# Patient Record
Sex: FEMALE | NOT HISPANIC OR LATINO | Employment: FULL TIME | URBAN - METROPOLITAN AREA
[De-identification: names, ages, dates, MRNs, and addresses within clinical notes are randomized per-mention and may not be internally consistent; named-entity substitution may affect disease eponyms.]

---

## 2023-07-25 ENCOUNTER — OFFICE VISIT (OUTPATIENT)
Dept: OBGYN CLINIC | Facility: CLINIC | Age: 28
End: 2023-07-25
Payer: COMMERCIAL

## 2023-07-25 VITALS
HEIGHT: 67 IN | BODY MASS INDEX: 23.7 KG/M2 | SYSTOLIC BLOOD PRESSURE: 118 MMHG | WEIGHT: 151 LBS | DIASTOLIC BLOOD PRESSURE: 66 MMHG

## 2023-07-25 DIAGNOSIS — N91.2 AMENORRHEA: Primary | ICD-10-CM

## 2023-07-25 PROCEDURE — 99203 OFFICE O/P NEW LOW 30 MIN: CPT | Performed by: OBSTETRICS & GYNECOLOGY

## 2023-07-25 PROCEDURE — 76817 TRANSVAGINAL US OBSTETRIC: CPT | Performed by: OBSTETRICS & GYNECOLOGY

## 2023-07-25 RX ORDER — ACETAMINOPHEN 325 MG/1
650 TABLET ORAL EVERY 6 HOURS PRN
COMMUNITY

## 2023-07-25 NOTE — PROGRESS NOTES
Assessment/Plan:  - Viable IUP @ 10w 2d EGA  - MONICA 2024  -referral to MFM   - Continue PNV  - Patient to call for concerns  - RTO 3 weeks for OB intake    Diagnoses and all orders for this visit:    Amenorrhea  -     AMB US OB < 14 weeks single or first gestation level 1    Other orders  -     Prenatal Vit-DSS-Fe Cbn-FA (PRENATAL AD PO); Take by mouth  -     acetaminophen (TYLENOL) 325 mg tablet; Take 650 mg by mouth every 6 (six) hours as needed for mild pain                 Subjective:       Patient ID: Tobi Gray [id number]        Tobi Gray is a 32 y.o. Davis Dancer presenting to the office for pregnancy confirmation. Patient's last menstrual period was 2023 (exact date). , placing her at 10w2d today with MONICA of 2024. She is feeling nauseous. OB History    Para Term  AB Living   1 1 0 1   1   SAB IAB Ectopic Multiple Live Births           1      # Outcome Date GA Lbr Freddy/2nd Weight Sex Delivery Anes PTL Lv   1  22    M Vag-Spont   SINGH         The following portions of the patient's history were reviewed and updated as appropriate: allergies, past family history, past social history and problem list.    Allergies:  Cat hair extract    Medications:    Current Outpatient Medications:   •  acetaminophen (TYLENOL) 325 mg tablet, Take 650 mg by mouth every 6 (six) hours as needed for mild pain, Disp: , Rfl:   •  Prenatal Vit-DSS-Fe Cbn-FA (PRENATAL AD PO), Take by mouth, Disp: , Rfl:       Review of Systems:   Review of Systems       Objective:       Visit Vitals  /66 (BP Location: Right arm, Patient Position: Sitting, Cuff Size: Standard)   Ht 5' 7" (1.702 m)   Wt 68.5 kg (151 lb)   LMP 2023 (Exact Date)   BMI 23.65 kg/m²   OB Status Unknown   Smoking Status Never   BSA 1.79 m²        GEN: The patient was alert and oriented x3, pleasant well-appearing female in no acute distress.    CV: Regular rate  PULM: nonlabored respirations  MSK: Normal gait  Skin: warm, dry  Neuro: no focal deficits  Psych: normal affect and judgement, cooperative    Ultrasound:     Viability US     Gestational sac: present               Location: intrauterine  Yolk sac: present  Fetal pole: present               CRL: 3.86 cm = 10w4d  Cardiac activity: present               Rate: 167 bpm     Ovaries: normal appearing bilaterally  Cul de sac: absence of free fluid  Uterus: normal in appearance           Cee Martinez MD  07/25/23  1:02 PM

## 2023-07-25 NOTE — PROGRESS NOTES
OB INTAKE INTERVIEW  Pt presents for OB intake. The patient was oriented to our practice and all questions were answered. Plan:  - Prenatal labs ordered  - Referral given for MFM at intake for anatomy scan  - Carrier screens reviewed- declined  - Patient to call for concerns  - RTO 3-4 weeks for OB F/U visit and PAP/Cultures    ~Last pap: 2-3 years ago; repeat at first prenatal   ~Last gc/c: none    Last Menstrual Period:  2023                  Ultrasound date: 23 @ 10w 2d  MONICA 2024    OB History    Para Term  AB Living   2 1 1 0   1   SAB IAB Ectopic Multiple Live Births           1      # Outcome Date GA Lbr Freddy/2nd Weight Sex Delivery Anes PTL Lv   2 Current            1 Term 22 39w0d  2977 g (6 lb 9 oz) M Vag-Spont EPI  SINGH       Current Issues:  Constipation : yes taking Colace                Headaches : no                 Cramping: no                     Spotting :no                      Current Medications:  prenatal vitamin, Colace and Tylenol as needed        Interview education  · [facility] Pregnancy Essentials reviewed and discussed   · Baby and [address] Handout  · [facility] MFM Handouts  · Discussed genetic testing  · Prenatal lab work: Scripts printed and given to pt.    · FMLA paperwork to be started around 28 weeks      Prior Pregnancy Delivery Complications              History of  delivery or PPROM: no  History of Shoulder Dystocia: no              History of vacuum or forceps delivery: no              History of 3rd/4th degree laceration: no              History of  section: no     Diabetes              Pregestational DM: no              hx of GDM*: no              BMI >35*: no              first degree relative with type 2 diabetes*: no              hx of PCOS: no              current metformin use: no              prior hx of LGA/macrosomia*: no  *If yes add early 1HR glucose*               Hypertension              Hx of chronic HTN*: no              hx of gestational HTN*: no              hx of preeclampsia, eclampsia, or HELLP syndrome*: YES at delivery- normal Bps during the pregnancy. Pt was on Procardia for six weeks after delivery              Age 28 or older: no              Multifetal gestation:no  Type 1 or Type 2 DM:no  Renal Disease: no  Autoimmune disease (systemic lupus erythematosus, antiphospholipid antibody syndrome): no  Nulliparity: no  Obesity (BMI over 30): no  More than 10 year pregnancy interval: no  Previous IUGR, low birthweight or small for gestational age:no    CMP, PrCr, Uric acid added to labs     Immunizations:                influenza vaccine: yes              Covid Vaccination: yes  Discussed Tdap vaccine: yes                     Dental visit with last 6 months:  yes  PHQ-2/9 score: 0   - Hx of depression or post partum depression?: 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 She will let us know how she feels at each visit.     Grey activated (not 1518 years of age)?: yes    ONAF submitted?: no

## 2023-07-26 ENCOUNTER — INITIAL PRENATAL (OUTPATIENT)
Dept: OBGYN CLINIC | Facility: CLINIC | Age: 28
End: 2023-07-26

## 2023-07-26 VITALS — DIASTOLIC BLOOD PRESSURE: 74 MMHG | BODY MASS INDEX: 23.96 KG/M2 | SYSTOLIC BLOOD PRESSURE: 134 MMHG | WEIGHT: 153 LBS

## 2023-07-26 DIAGNOSIS — Z34.81 ENCOUNTER FOR SUPERVISION OF OTHER NORMAL PREGNANCY IN FIRST TRIMESTER: ICD-10-CM

## 2023-07-26 DIAGNOSIS — O09.299 HISTORY OF PRE-ECLAMPSIA IN PRIOR PREGNANCY, CURRENTLY PREGNANT: Primary | ICD-10-CM

## 2023-07-26 NOTE — PATIENT INSTRUCTIONS
Please refer to [place] Pregnancy Essentials Guide  [facility] ([email or web address])  to access our pregnancy essentials reference guide. Here you will find a lot of information for all trimesters of pregnancy as well as postpartum. You will be able to access information about medications that are safe to use during pregnancy, warning signs in third trimester, what to pack for your hospital stay and many other useful guides. There is also information on class available through our [address] and Pregnancy Classes  [facility] ([email or web address]). Please do not hesitate to contact the office through [address] [place] [address] or by calling for [phone] with any questions or concerns. We look forward to seeing you at your next scheduled visit!

## 2023-08-07 ENCOUNTER — TELEPHONE (OUTPATIENT)
Facility: HOSPITAL | Age: 28
End: 2023-08-07

## 2023-08-07 ENCOUNTER — APPOINTMENT (OUTPATIENT)
Dept: LAB | Facility: AMBULARY SURGERY CENTER | Age: 28
End: 2023-08-07
Payer: COMMERCIAL

## 2023-08-07 DIAGNOSIS — O09.299 HISTORY OF PRE-ECLAMPSIA IN PRIOR PREGNANCY, CURRENTLY PREGNANT: ICD-10-CM

## 2023-08-07 DIAGNOSIS — Z34.81 ENCOUNTER FOR SUPERVISION OF OTHER NORMAL PREGNANCY IN FIRST TRIMESTER: ICD-10-CM

## 2023-08-07 LAB
ABO GROUP BLD: NORMAL
ALBUMIN SERPL BCP-MCNC: 3.1 G/DL (ref 3.5–5)
ALP SERPL-CCNC: 52 U/L (ref 46–116)
ALT SERPL W P-5'-P-CCNC: 22 U/L (ref 12–78)
ANION GAP SERPL CALCULATED.3IONS-SCNC: 2 MMOL/L
AST SERPL W P-5'-P-CCNC: 16 U/L (ref 5–45)
BASOPHILS # BLD AUTO: 0.04 THOUSANDS/ÂΜL (ref 0–0.1)
BASOPHILS NFR BLD AUTO: 0 % (ref 0–1)
BILIRUB SERPL-MCNC: 0.49 MG/DL (ref 0.2–1)
BLD GP AB SCN SERPL QL: NEGATIVE
BUN SERPL-MCNC: 10 MG/DL (ref 5–25)
CALCIUM ALBUM COR SERPL-MCNC: 9.1 MG/DL (ref 8.3–10.1)
CALCIUM SERPL-MCNC: 8.4 MG/DL (ref 8.3–10.1)
CHLORIDE SERPL-SCNC: 109 MMOL/L (ref 96–108)
CO2 SERPL-SCNC: 27 MMOL/L (ref 21–32)
CREAT SERPL-MCNC: 0.61 MG/DL (ref 0.6–1.3)
CREAT UR-MCNC: 79.2 MG/DL
EOSINOPHIL # BLD AUTO: 0.09 THOUSAND/ÂΜL (ref 0–0.61)
EOSINOPHIL NFR BLD AUTO: 1 % (ref 0–6)
ERYTHROCYTE [DISTWIDTH] IN BLOOD BY AUTOMATED COUNT: 13.4 % (ref 11.6–15.1)
GFR SERPL CREATININE-BSD FRML MDRD: 122 ML/MIN/1.73SQ M
GLUCOSE P FAST SERPL-MCNC: 82 MG/DL (ref 65–99)
HBV SURFACE AG SER QL: NORMAL
HCT VFR BLD AUTO: 36.9 % (ref 34.8–46.1)
HCV AB SER QL: NORMAL
HGB BLD-MCNC: 12.5 G/DL (ref 11.5–15.4)
HIV 1+2 AB+HIV1 P24 AG SERPL QL IA: NORMAL
HIV 2 AB SERPL QL IA: NORMAL
HIV1 AB SERPL QL IA: NORMAL
HIV1 P24 AG SERPL QL IA: NORMAL
IMM GRANULOCYTES # BLD AUTO: 0.05 THOUSAND/UL (ref 0–0.2)
IMM GRANULOCYTES NFR BLD AUTO: 1 % (ref 0–2)
LYMPHOCYTES # BLD AUTO: 1.59 THOUSANDS/ÂΜL (ref 0.6–4.47)
LYMPHOCYTES NFR BLD AUTO: 18 % (ref 14–44)
MCH RBC QN AUTO: 30.1 PG (ref 26.8–34.3)
MCHC RBC AUTO-ENTMCNC: 33.9 G/DL (ref 31.4–37.4)
MCV RBC AUTO: 89 FL (ref 82–98)
MONOCYTES # BLD AUTO: 0.41 THOUSAND/ÂΜL (ref 0.17–1.22)
MONOCYTES NFR BLD AUTO: 5 % (ref 4–12)
NEUTROPHILS # BLD AUTO: 6.79 THOUSANDS/ÂΜL (ref 1.85–7.62)
NEUTS SEG NFR BLD AUTO: 75 % (ref 43–75)
NRBC BLD AUTO-RTO: 0 /100 WBCS
PLATELET # BLD AUTO: 278 THOUSANDS/UL (ref 149–390)
PMV BLD AUTO: 10.4 FL (ref 8.9–12.7)
POTASSIUM SERPL-SCNC: 3.8 MMOL/L (ref 3.5–5.3)
PROT SERPL-MCNC: 6.6 G/DL (ref 6.4–8.4)
PROT UR-MCNC: 41 MG/DL
PROT/CREAT UR: 0.52 MG/G{CREAT} (ref 0–0.1)
RBC # BLD AUTO: 4.15 MILLION/UL (ref 3.81–5.12)
RH BLD: POSITIVE
RUBV IGG SERPL IA-ACNC: 16 IU/ML
SODIUM SERPL-SCNC: 138 MMOL/L (ref 135–147)
SPECIMEN EXPIRATION DATE: NORMAL
TREPONEMA PALLIDUM IGG+IGM AB [PRESENCE] IN SERUM OR PLASMA BY IMMUNOASSAY: NORMAL
URATE SERPL-MCNC: 3.9 MG/DL (ref 2–7.5)
VZV IGG SER QL IA: NORMAL
WBC # BLD AUTO: 8.97 THOUSAND/UL (ref 4.31–10.16)

## 2023-08-07 PROCEDURE — 85025 COMPLETE CBC W/AUTO DIFF WBC: CPT

## 2023-08-07 PROCEDURE — 87340 HEPATITIS B SURFACE AG IA: CPT

## 2023-08-07 PROCEDURE — 82570 ASSAY OF URINE CREATININE: CPT

## 2023-08-07 PROCEDURE — 86900 BLOOD TYPING SEROLOGIC ABO: CPT

## 2023-08-07 PROCEDURE — 80053 COMPREHEN METABOLIC PANEL: CPT

## 2023-08-07 PROCEDURE — 86762 RUBELLA ANTIBODY: CPT

## 2023-08-07 PROCEDURE — 86787 VARICELLA-ZOSTER ANTIBODY: CPT

## 2023-08-07 PROCEDURE — 87086 URINE CULTURE/COLONY COUNT: CPT

## 2023-08-07 PROCEDURE — 84550 ASSAY OF BLOOD/URIC ACID: CPT

## 2023-08-07 PROCEDURE — 87389 HIV-1 AG W/HIV-1&-2 AB AG IA: CPT

## 2023-08-07 PROCEDURE — 86780 TREPONEMA PALLIDUM: CPT

## 2023-08-07 PROCEDURE — 36415 COLL VENOUS BLD VENIPUNCTURE: CPT

## 2023-08-07 PROCEDURE — 86850 RBC ANTIBODY SCREEN: CPT

## 2023-08-07 PROCEDURE — 86901 BLOOD TYPING SEROLOGIC RH(D): CPT

## 2023-08-07 PROCEDURE — 84156 ASSAY OF PROTEIN URINE: CPT

## 2023-08-07 PROCEDURE — 86803 HEPATITIS C AB TEST: CPT

## 2023-08-07 NOTE — TELEPHONE ENCOUNTER
Patient returned phone call in regards to 8/10 appointment that was scheduled. Patient does not want to reschedule. Offered patient genetic counseling but patient declined.

## 2023-08-08 LAB — BACTERIA UR CULT: NORMAL

## 2023-08-09 ENCOUNTER — ROUTINE PRENATAL (OUTPATIENT)
Dept: OBGYN CLINIC | Facility: CLINIC | Age: 28
End: 2023-08-09

## 2023-08-09 VITALS — BODY MASS INDEX: 23.93 KG/M2 | DIASTOLIC BLOOD PRESSURE: 70 MMHG | SYSTOLIC BLOOD PRESSURE: 132 MMHG | WEIGHT: 152.8 LBS

## 2023-08-09 DIAGNOSIS — Z87.59 HISTORY OF PRE-ECLAMPSIA: Primary | ICD-10-CM

## 2023-08-09 DIAGNOSIS — Z3A.12 12 WEEKS GESTATION OF PREGNANCY: ICD-10-CM

## 2023-08-09 DIAGNOSIS — N63.0 BREAST LUMP IN UPPER OUTER QUADRANT: ICD-10-CM

## 2023-08-09 DIAGNOSIS — Z34.81 ENCOUNTER FOR SUPERVISION OF OTHER NORMAL PREGNANCY IN FIRST TRIMESTER: ICD-10-CM

## 2023-08-09 PROCEDURE — G0145 SCR C/V CYTO,THINLAYER,RESCR: HCPCS | Performed by: OBSTETRICS & GYNECOLOGY

## 2023-08-09 PROCEDURE — 87491 CHLMYD TRACH DNA AMP PROBE: CPT | Performed by: OBSTETRICS & GYNECOLOGY

## 2023-08-09 PROCEDURE — PNV: Performed by: OBSTETRICS & GYNECOLOGY

## 2023-08-09 PROCEDURE — 87591 N.GONORRHOEAE DNA AMP PROB: CPT | Performed by: OBSTETRICS & GYNECOLOGY

## 2023-08-09 RX ORDER — ASPIRIN 81 MG/1
162 TABLET, CHEWABLE ORAL DAILY
Qty: 60 TABLET | Refills: 5 | Status: SHIPPED | OUTPATIENT
Start: 2023-08-09 | End: 2023-09-08

## 2023-08-09 NOTE — PROGRESS NOTES
32 y.o.  female at [address] (Estimated Date of Delivery: 24) for PNV. Pre- Vitals    Flowsheet Row Most Recent Value   Prenatal Assessment    Prenatal Vitals    Blood Pressure 132/70   Weight - Scale 69.3 kg (152 lb 12.8 oz)   Urine Albumin/Glucose    Dilation/Effacement/Station    Vaginal Drainage    Edema         TWG: Not found. Pap collected  Hx of Pre-E, baseline Protein: Cr ratio is elevated at 0.52 (discussed)  Sent Rx for Aspirin 162mg po daily and advised to c/w until 36 weeks, reviewed indication to reduce risk for pre-E  Pt. Declines NT and NIPT; she canceled MFM appt. Concerned with right breast lump she has had since breastfeeding her last child. On exam right breast lump palpated ~2 cm from areola at 11-12 o'clock. Order for breast u/s.

## 2023-08-09 NOTE — PROGRESS NOTES
Patient states she is feeling well, no complaints. She states after she stopped breastfeeding her son she noticed a hard spot in her right breast. Patient would also like to review recent lab work.

## 2023-08-11 LAB
C TRACH DNA SPEC QL NAA+PROBE: NEGATIVE
N GONORRHOEA DNA SPEC QL NAA+PROBE: NEGATIVE

## 2023-08-15 LAB
LAB AP GYN PRIMARY INTERPRETATION: NORMAL
Lab: NORMAL
PATH INTERP SPEC-IMP: NORMAL

## 2023-09-07 ENCOUNTER — ROUTINE PRENATAL (OUTPATIENT)
Dept: OBGYN CLINIC | Facility: CLINIC | Age: 28
End: 2023-09-07

## 2023-09-07 VITALS — DIASTOLIC BLOOD PRESSURE: 68 MMHG | SYSTOLIC BLOOD PRESSURE: 122 MMHG | BODY MASS INDEX: 24.59 KG/M2 | WEIGHT: 157 LBS

## 2023-09-07 DIAGNOSIS — Z34.82 PRENATAL CARE, SUBSEQUENT PREGNANCY IN SECOND TRIMESTER: Primary | ICD-10-CM

## 2023-09-07 DIAGNOSIS — Z36.9 ANTENATAL SCREENING ENCOUNTER: ICD-10-CM

## 2023-09-07 PROCEDURE — PNV: Performed by: PHYSICIAN ASSISTANT

## 2023-09-07 NOTE — PROGRESS NOTES
Patient states that she has been experiencing more of a light discharge since pregnancy that she is just wondering if that's normal. She denies any pain or irritation but states she did have a mild itch last weekend that has since went away.

## 2023-09-07 NOTE — PROGRESS NOTES
25 y.o.  female at 16w4d (Estimated Date of Delivery: 24) for PNV. Pre-Latosha Vitals    Flowsheet Row Most Recent Value   Prenatal Assessment    Movement Absent   Prenatal Vitals    Blood Pressure 122/68   Weight - Scale 71.2 kg (157 lb)   Urine Albumin/Glucose    Dilation/Effacement/Station    Vaginal Drainage    Edema         TW.814 kg (4 lb)    Leakage of fluid: no  Vaginal bleeding: no  Contractions/Cramping: no  Fetal movement: no    Nausea improving  rx for afp given  Adv ok to keep eye on discharge--scant, thin white  No itch or burn or odor  Witch hazel prn    RTO in 4 weeks.

## 2023-09-22 ENCOUNTER — HOSPITAL ENCOUNTER (OUTPATIENT)
Dept: ULTRASOUND IMAGING | Facility: CLINIC | Age: 28
End: 2023-09-22
Payer: COMMERCIAL

## 2023-09-22 DIAGNOSIS — N63.0 BREAST LUMP IN UPPER OUTER QUADRANT: ICD-10-CM

## 2023-09-22 PROCEDURE — 76642 ULTRASOUND BREAST LIMITED: CPT

## 2023-10-05 ENCOUNTER — ROUTINE PRENATAL (OUTPATIENT)
Facility: HOSPITAL | Age: 28
End: 2023-10-05
Attending: OBSTETRICS & GYNECOLOGY
Payer: COMMERCIAL

## 2023-10-05 ENCOUNTER — ROUTINE PRENATAL (OUTPATIENT)
Dept: OBGYN CLINIC | Facility: CLINIC | Age: 28
End: 2023-10-05

## 2023-10-05 VITALS
SYSTOLIC BLOOD PRESSURE: 118 MMHG | WEIGHT: 164.46 LBS | DIASTOLIC BLOOD PRESSURE: 50 MMHG | BODY MASS INDEX: 25.81 KG/M2 | HEART RATE: 69 BPM | HEIGHT: 67 IN

## 2023-10-05 VITALS — WEIGHT: 162.6 LBS | DIASTOLIC BLOOD PRESSURE: 62 MMHG | SYSTOLIC BLOOD PRESSURE: 118 MMHG | BODY MASS INDEX: 25.47 KG/M2

## 2023-10-05 DIAGNOSIS — Z36.3 ENCOUNTER FOR ANTENATAL SCREENING FOR MALFORMATION: ICD-10-CM

## 2023-10-05 DIAGNOSIS — O12.12 PROTEINURIA AFFECTING PREGNANCY IN SECOND TRIMESTER: Primary | ICD-10-CM

## 2023-10-05 DIAGNOSIS — Z3A.20 20 WEEKS GESTATION OF PREGNANCY: ICD-10-CM

## 2023-10-05 DIAGNOSIS — O09.299 HISTORY OF PRE-ECLAMPSIA IN PRIOR PREGNANCY, CURRENTLY PREGNANT: ICD-10-CM

## 2023-10-05 DIAGNOSIS — Z34.81 ENCOUNTER FOR SUPERVISION OF OTHER NORMAL PREGNANCY IN FIRST TRIMESTER: ICD-10-CM

## 2023-10-05 DIAGNOSIS — Z36.86 ENCOUNTER FOR ANTENATAL SCREENING FOR CERVICAL LENGTH: ICD-10-CM

## 2023-10-05 PROCEDURE — 76811 OB US DETAILED SNGL FETUS: CPT | Performed by: OBSTETRICS & GYNECOLOGY

## 2023-10-05 PROCEDURE — 99243 OFF/OP CNSLTJ NEW/EST LOW 30: CPT | Performed by: OBSTETRICS & GYNECOLOGY

## 2023-10-05 PROCEDURE — 76817 TRANSVAGINAL US OBSTETRIC: CPT | Performed by: OBSTETRICS & GYNECOLOGY

## 2023-10-05 PROCEDURE — PNV: Performed by: OBSTETRICS & GYNECOLOGY

## 2023-10-05 NOTE — PROGRESS NOTES
25 y.o.   female at 22.1 wga for PNV. BP : 118/62. TW  Feeling well.   No complaints  Proteinuria - will repeat P:C, if elevated will do 24h urine collection  Reviewed PTL precautions  Flu vaccine next visit  F/u 4 weeks

## 2023-10-05 NOTE — PROGRESS NOTES
Zachary Aldridge Md  [address] [facility],  [address] [facility]     Dear Dr. Vaishali Lang     Thank you for requesting a  consultation on your patient Trevon Horton for the following indications: Fetal anatomy survey. She previously declined nuchal translucency and NIPT through her OB office. She has not completed her MSAFP screen by today's ultrasound. History  Past medical history: History of preeclampsia found at the time of delivery. She reports she was discharged home with recommendations to monitor her blood pressures. She was readmitted 3 days later and was started on blood pressure medication that she continued for the next 6 weeks. Past surgical history: [place] tooth extraction  Medications: Aspirin 162 mg daily, Colace, MiraLAX, prenatal vitamins  Allergies to medications: None  Past obstetrical history:  3/18/2022 at 39 weeks and 0 days she had 6 pound 9 ounce baby boy vaginally with a history of preeclampsia diagnosed at the time of delivery. Social history: Does not report any substance use  First generation family history: Her partner has type 1 diabetes    Review of her prenatal labs shows a PC ratio of 0.52 which is elevated for pregnancy. Rest of her baseline preeclamptic labs were normal.  A urine culture showed no evidence for infection. I did not see any evidence for a urine analysis drawn at the same time. Ultrasound findings: Today's ultrasound shows a fetus that is growing concordant with her dates. Cervical length and amniotic fluid appears normal.  No malformations are detected. The patient was informed of the findings and counseled about the limitations of the exam in detecting all forms of fetal congenital abnormalities. She does not report any vaginal bleeding or uterine cramping/contractions. She does feel fetal movement. Specific counseling was provided on the following problems:  1.   She was previously started on baby aspirin 162 mg daily to lower her risk for developing preeclampsia. Baseline preeclamptic labs showed a elevated PC ratio of 0.52. This can be associated with increased risk for preeclampsia if this value is confirmed. Follow up recommended:     No further follow-up ultrasounds are recommended at this time. She is seeing her provider next. Recommend a repeat PC ratio to confirm if this truly is elevated or is a false positive result. If it returns again is elevated then recommend a 24-hour protein level to get a more accurate 24 hr measurement. Pre visit time reviewing her records  5 minutes  Face to face time 10 minutes  Post visit time on documentation of note, updating her problem list, adding orders and prescriptions 15 minutes. Procedures that were completed today were charged separately. The level of decision making was low level complexity.     Belen Armstrong MD

## 2023-10-05 NOTE — PROGRESS NOTES
Patient states she had MFM appointment today and has questions regarding lab work that was done in August.     Urine neg/neg

## 2023-10-05 NOTE — LETTER
2023     Juliette Lucia MD  [address] [facility]  [address]  [address] [facility]    Patient: Rodger Fitzgerald   YOB: 1995   Date of Visit: 10/5/2023       Dear Dr. Tiana Barron:    Thank you for referring Rodger Fitzgerald to me for evaluation. Below are my notes for this consultation. If you have questions, please do not hesitate to call me. I look forward to following your patient along with you. Sincerely,        Andrew Sargent MD        CC: No Recipients    Andrew Sargent MD  10/5/2023  2:35 PM  Sign when Signing Visit  Tavares Barrios Md  [address] [facility],  [address] [facility]     Dear Dr. Tiana Barron     Thank you for requesting a  consultation on your patient Rodger Fitzgerald for the following indications: Fetal anatomy survey. She previously declined nuchal translucency and NIPT through her OB office. She has not completed her MSAFP screen by today's ultrasound. History  Past medical history: History of preeclampsia found at the time of delivery. She reports she was discharged home with recommendations to monitor her blood pressures. She was readmitted 3 days later and was started on blood pressure medication that she continued for the next 6 weeks. Past surgical history: [place] tooth extraction  Medications: Aspirin 162 mg daily, Colace, MiraLAX, prenatal vitamins  Allergies to medications: None  Past obstetrical history:  3/18/2022 at 39 weeks and 0 days she had 6 pound 9 ounce baby boy vaginally with a history of preeclampsia diagnosed at the time of delivery. Social history: Does not report any substance use  First generation family history: Her partner has type 1 diabetes    Review of her prenatal labs shows a PC ratio of 0.52 which is elevated for pregnancy. Rest of her baseline preeclamptic labs were normal.  A urine culture showed no evidence for infection.   I did not see any evidence for a urine analysis drawn at the same Pt transferred to G35, report received from Onur SHERMAN.    time.     Ultrasound findings: Today's ultrasound shows a fetus that is growing concordant with her dates. Cervical length and amniotic fluid appears normal.  No malformations are detected. The patient was informed of the findings and counseled about the limitations of the exam in detecting all forms of fetal congenital abnormalities. She does not report any vaginal bleeding or uterine cramping/contractions. She does feel fetal movement. Specific counseling was provided on the following problems:  1. She was previously started on baby aspirin 162 mg daily to lower her risk for developing preeclampsia. Baseline preeclamptic labs showed a elevated PC ratio of 0.52. This can be associated with increased risk for preeclampsia if this value is confirmed. Follow up recommended:     No further follow-up ultrasounds are recommended at this time. She is seeing her provider next. Recommend a repeat PC ratio to confirm if this truly is elevated or is a false positive result. If it returns again is elevated then recommend a 24-hour protein level to get a more accurate 24 hr measurement. Pre visit time reviewing her records  5 minutes  Face to face time 10 minutes  Post visit time on documentation of note, updating her problem list, adding orders and prescriptions 15 minutes. Procedures that were completed today were charged separately. The level of decision making was low level complexity.     Elisa Barbosa MD

## 2023-10-07 ENCOUNTER — APPOINTMENT (OUTPATIENT)
Dept: LAB | Facility: CLINIC | Age: 28
End: 2023-10-07
Payer: COMMERCIAL

## 2023-10-07 DIAGNOSIS — Z34.82 PRENATAL CARE, SUBSEQUENT PREGNANCY IN SECOND TRIMESTER: ICD-10-CM

## 2023-10-07 DIAGNOSIS — O12.12 PROTEINURIA AFFECTING PREGNANCY IN SECOND TRIMESTER: ICD-10-CM

## 2023-10-07 DIAGNOSIS — O09.299 HISTORY OF PRE-ECLAMPSIA IN PRIOR PREGNANCY, CURRENTLY PREGNANT: ICD-10-CM

## 2023-10-07 DIAGNOSIS — Z36.9 ANTENATAL SCREENING ENCOUNTER: ICD-10-CM

## 2023-10-07 LAB
CREAT UR-MCNC: 22 MG/DL
PROT UR-MCNC: 7 MG/DL
PROT/CREAT UR: 0.32 MG/G{CREAT} (ref 0–0.1)

## 2023-10-07 PROCEDURE — 82105 ALPHA-FETOPROTEIN SERUM: CPT

## 2023-10-07 PROCEDURE — 82570 ASSAY OF URINE CREATININE: CPT

## 2023-10-07 PROCEDURE — 84156 ASSAY OF PROTEIN URINE: CPT

## 2023-10-07 PROCEDURE — 36415 COLL VENOUS BLD VENIPUNCTURE: CPT

## 2023-10-09 DIAGNOSIS — O12.12 PROTEINURIA AFFECTING PREGNANCY IN SECOND TRIMESTER: Primary | ICD-10-CM

## 2023-10-10 ENCOUNTER — APPOINTMENT (OUTPATIENT)
Dept: LAB | Facility: AMBULARY SURGERY CENTER | Age: 28
End: 2023-10-10

## 2023-10-11 ENCOUNTER — APPOINTMENT (OUTPATIENT)
Dept: LAB | Facility: AMBULARY SURGERY CENTER | Age: 28
End: 2023-10-11

## 2023-10-11 ENCOUNTER — TELEPHONE (OUTPATIENT)
Dept: OBGYN CLINIC | Facility: CLINIC | Age: 28
End: 2023-10-11

## 2023-10-11 NOTE — TELEPHONE ENCOUNTER
Patient called, her AFP screened positive.  Is someone from [facility] able to call her to review the results with her

## 2023-10-12 ENCOUNTER — APPOINTMENT (OUTPATIENT)
Dept: LAB | Facility: AMBULARY SURGERY CENTER | Age: 28
End: 2023-10-12
Payer: COMMERCIAL

## 2023-10-12 ENCOUNTER — TELEPHONE (OUTPATIENT)
Dept: PERINATAL CARE | Facility: CLINIC | Age: 28
End: 2023-10-12

## 2023-10-12 LAB
2ND TRIMESTER 4 SCREEN SERPL-IMP: NORMAL
AFP ADJ MOM SERPL: 1.64
AFP INTERP AMN-IMP: NORMAL
AFP INTERP SERPL-IMP: NORMAL
AFP INTERP SERPL-IMP: NORMAL
AFP SERPL-MCNC: 102.7 NG/ML
AGE AT DELIVERY: 30.5 YR
GA METHOD: NORMAL
GA: 20.9 WEEKS
IDDM PATIENT QL: NO
MULTIPLE PREGNANCY: NO
NEURAL TUBE DEFECT RISK FETUS: 1895 %

## 2023-10-12 NOTE — TELEPHONE ENCOUNTER
Called patient and confirmed date of birth. Discussed previous elevated MSAFP and that the lab used wrong information when calculating the result. Patient stated she did see the result and was very upset, especially as she did not get a phone call from her OB office about it. She also saw the note that Dr. Corina Martinez sent her about the result. Explained that the MSAFP was recalculated using the correct information and the actual result was negative/within normal limits. Patient was very relieved to hear this news. Discussed that as it's normal we don't need to do anything further and will cancel her genetic counseling appointment tomorrow which she was agreeable to. All of the patient's questions were answered to her satisfaction and she was provided with my direct phone number for any further questions or concerns.

## 2023-10-16 ENCOUNTER — TELEPHONE (OUTPATIENT)
Dept: PERINATAL CARE | Facility: OTHER | Age: 28
End: 2023-10-16

## 2023-10-16 DIAGNOSIS — O12.12 PROTEINURIA AFFECTING PREGNANCY IN SECOND TRIMESTER: Primary | ICD-10-CM

## 2023-10-16 DIAGNOSIS — Z3A.22 22 WEEKS GESTATION OF PREGNANCY: ICD-10-CM

## 2023-10-17 ENCOUNTER — PATIENT MESSAGE (OUTPATIENT)
Dept: NEPHROLOGY | Facility: CLINIC | Age: 28
End: 2023-10-17

## 2023-10-17 NOTE — TELEPHONE ENCOUNTER
I called Rao Stevens this am at 8:07 about her baseline proteinuria and ordered a repeat urine culture since her last was done 2 months ago. She has a history of preeclampsia that developed postpartum. This can increase her risk of a recurrence. Etiology  of the proteinuria unknown. I recommended a repeat 24-hour urine at 6 weeks postpartum. If protein still elevated then would refer to nephrology. She is aware we do not usually refer to nephrology during pregnancy because part of the work-up may be a renal biopsy that is not usually completed in pregnancy due to the increased risk for bleeding complications and it often does not  in pregnancy.     Julio Albarran MD

## 2023-11-03 ENCOUNTER — ROUTINE PRENATAL (OUTPATIENT)
Dept: OBGYN CLINIC | Facility: CLINIC | Age: 28
End: 2023-11-03

## 2023-11-03 VITALS — WEIGHT: 170.8 LBS | SYSTOLIC BLOOD PRESSURE: 108 MMHG | DIASTOLIC BLOOD PRESSURE: 62 MMHG | BODY MASS INDEX: 26.75 KG/M2

## 2023-11-03 DIAGNOSIS — Z3A.24 24 WEEKS GESTATION OF PREGNANCY: ICD-10-CM

## 2023-11-03 DIAGNOSIS — O12.12 PROTEINURIA AFFECTING PREGNANCY IN SECOND TRIMESTER: Primary | ICD-10-CM

## 2023-11-03 PROCEDURE — PNV: Performed by: PHYSICIAN ASSISTANT

## 2023-11-03 NOTE — PROGRESS NOTES
Patient is a 26 YO  female presenting to the office at 24.5 weeks for routine OB care.    BP: 108/62  TWlb  Fetal Movement: yes good movement  LOF: no  VB: no  CTX: no  Reviewed precautions  28 week labs ordered  Call for concerns  RTO 4 weeks

## 2023-11-17 ENCOUNTER — ROUTINE PRENATAL (OUTPATIENT)
Dept: OBGYN CLINIC | Facility: CLINIC | Age: 28
End: 2023-11-17

## 2023-11-17 VITALS — DIASTOLIC BLOOD PRESSURE: 78 MMHG | SYSTOLIC BLOOD PRESSURE: 114 MMHG | BODY MASS INDEX: 27 KG/M2 | WEIGHT: 172.4 LBS

## 2023-11-17 DIAGNOSIS — O09.299 HISTORY OF PRE-ECLAMPSIA IN PRIOR PREGNANCY, CURRENTLY PREGNANT: Primary | ICD-10-CM

## 2023-11-17 DIAGNOSIS — Z23 NEED FOR INFLUENZA VACCINATION: ICD-10-CM

## 2023-11-17 DIAGNOSIS — Z3A.26 26 WEEKS GESTATION OF PREGNANCY: ICD-10-CM

## 2023-11-17 NOTE — PROGRESS NOTES
25 y.o.  female at 27w6d (Estimated Date of Delivery: 24) for PNV.     Pre-Latosha Vitals      Flowsheet Row Most Recent Value   Prenatal Assessment    Movement Present   Prenatal Vitals    Blood Pressure 114/78   Weight - Scale 78.2 kg (172 lb 6.4 oz)   Urine Albumin/Glucose    Dilation/Effacement/Station    Vaginal Drainage    Edema           TW.8 kg (19 lb 6.4 oz)  Doing well 2nd trimester  Encouraged to complete 26 wk labs  Flu shot today

## 2023-11-25 ENCOUNTER — LAB (OUTPATIENT)
Dept: LAB | Facility: CLINIC | Age: 28
End: 2023-11-25
Payer: COMMERCIAL

## 2023-11-25 DIAGNOSIS — O12.12 PROTEINURIA AFFECTING PREGNANCY IN SECOND TRIMESTER: ICD-10-CM

## 2023-11-25 DIAGNOSIS — Z3A.24 24 WEEKS GESTATION OF PREGNANCY: ICD-10-CM

## 2023-11-25 DIAGNOSIS — Z3A.22 22 WEEKS GESTATION OF PREGNANCY: ICD-10-CM

## 2023-11-25 LAB
ERYTHROCYTE [DISTWIDTH] IN BLOOD BY AUTOMATED COUNT: 13 % (ref 11.6–15.1)
GLUCOSE 1H P 50 G GLC PO SERPL-MCNC: 95 MG/DL (ref 40–134)
HCT VFR BLD AUTO: 35.5 % (ref 34.8–46.1)
HGB BLD-MCNC: 12.5 G/DL (ref 11.5–15.4)
MCH RBC QN AUTO: 32.9 PG (ref 26.8–34.3)
MCHC RBC AUTO-ENTMCNC: 35.2 G/DL (ref 31.4–37.4)
MCV RBC AUTO: 93 FL (ref 82–98)
PLATELET # BLD AUTO: 280 THOUSANDS/UL (ref 149–390)
PMV BLD AUTO: 10.1 FL (ref 8.9–12.7)
RBC # BLD AUTO: 3.8 MILLION/UL (ref 3.81–5.12)
WBC # BLD AUTO: 11.95 THOUSAND/UL (ref 4.31–10.16)

## 2023-11-25 PROCEDURE — 36415 COLL VENOUS BLD VENIPUNCTURE: CPT

## 2023-11-25 PROCEDURE — 86780 TREPONEMA PALLIDUM: CPT

## 2023-11-25 PROCEDURE — 85027 COMPLETE CBC AUTOMATED: CPT

## 2023-11-25 PROCEDURE — 87086 URINE CULTURE/COLONY COUNT: CPT

## 2023-11-25 PROCEDURE — 82950 GLUCOSE TEST: CPT

## 2023-11-26 LAB — BACTERIA UR CULT: ABNORMAL

## 2023-11-26 NOTE — RESULT ENCOUNTER NOTE
Gabriela Hsu,    This urine culture came back with normal vaginal khris. This is not a sign of an infection in your urine.     Eliane Ortiz MD

## 2023-11-27 LAB — TREPONEMA PALLIDUM IGG+IGM AB [PRESENCE] IN SERUM OR PLASMA BY IMMUNOASSAY: NORMAL

## 2023-12-01 ENCOUNTER — ROUTINE PRENATAL (OUTPATIENT)
Dept: OBGYN CLINIC | Facility: CLINIC | Age: 28
End: 2023-12-01
Payer: COMMERCIAL

## 2023-12-01 VITALS — BODY MASS INDEX: 27.38 KG/M2 | SYSTOLIC BLOOD PRESSURE: 112 MMHG | DIASTOLIC BLOOD PRESSURE: 60 MMHG | WEIGHT: 174.8 LBS

## 2023-12-01 DIAGNOSIS — Z3A.28 28 WEEKS GESTATION OF PREGNANCY: ICD-10-CM

## 2023-12-01 DIAGNOSIS — Z23 ENCOUNTER FOR IMMUNIZATION: ICD-10-CM

## 2023-12-01 DIAGNOSIS — O09.299 HISTORY OF PRE-ECLAMPSIA IN PRIOR PREGNANCY, CURRENTLY PREGNANT: Primary | ICD-10-CM

## 2023-12-01 LAB
DME PARACHUTE DELIVERY DATE REQUESTED: NORMAL
DME PARACHUTE ITEM DESCRIPTION: NORMAL
DME PARACHUTE ORDER STATUS: NORMAL
DME PARACHUTE SUPPLIER NAME: NORMAL
DME PARACHUTE SUPPLIER PHONE: NORMAL

## 2023-12-01 PROCEDURE — PNV: Performed by: OBSTETRICS & GYNECOLOGY

## 2023-12-01 PROCEDURE — 90715 TDAP VACCINE 7 YRS/> IM: CPT | Performed by: OBSTETRICS & GYNECOLOGY

## 2023-12-01 PROCEDURE — 90471 IMMUNIZATION ADMIN: CPT | Performed by: OBSTETRICS & GYNECOLOGY

## 2023-12-01 NOTE — PROGRESS NOTES
28 week, red folder visit. Pt is well, states there are no concerns. Denies vb,lof, and ctx.    Tdap: given   Breast pump: ordered

## 2023-12-01 NOTE — PROGRESS NOTES
This is a 25 y.o.  at 28w5d who presents for return OB visit. No complaints. Denies contractions, leakage, bleeding. Endorses fetal movement. 28 wk labs reviewed. TDAP given. Rhogam not indicated. 606/706 Catherine Gagnon reviewed. Consent signed. Full code. OK with blood transfusion     Doing well in 3rd trimester. Had some dark spotting. Denies any cramping. Encouraged to call with any red bleeding or cramping/ pain.

## 2023-12-14 PROBLEM — O12.13 PROTEINURIA AFFECTING PREGNANCY IN THIRD TRIMESTER: Status: ACTIVE | Noted: 2023-10-05

## 2023-12-14 PROBLEM — Z3A.30 30 WEEKS GESTATION OF PREGNANCY: Status: ACTIVE | Noted: 2023-10-05

## 2023-12-15 ENCOUNTER — ROUTINE PRENATAL (OUTPATIENT)
Dept: OBGYN CLINIC | Facility: CLINIC | Age: 28
End: 2023-12-15

## 2023-12-15 VITALS — BODY MASS INDEX: 27.72 KG/M2 | SYSTOLIC BLOOD PRESSURE: 114 MMHG | DIASTOLIC BLOOD PRESSURE: 66 MMHG | WEIGHT: 177 LBS

## 2023-12-15 DIAGNOSIS — O12.13 PROTEINURIA AFFECTING PREGNANCY IN THIRD TRIMESTER: Primary | ICD-10-CM

## 2023-12-15 DIAGNOSIS — Z3A.30 30 WEEKS GESTATION OF PREGNANCY: ICD-10-CM

## 2023-12-15 PROCEDURE — PNV: Performed by: OBSTETRICS & GYNECOLOGY

## 2023-12-15 NOTE — PROGRESS NOTES
Pt is here for her 30w prenatal. Pt denies any swelling,leakage,bleeding and contractions. Pt has shad some pressure/pinching but it went away. Pt also has some pain on her left glute some days.

## 2023-12-15 NOTE — PROGRESS NOTES
25 y.o.   female at 28.6 wga for PNV. BP : 114/66. TW  Feeling well.   No complaints  Reviewed RSV vaccine  Proteinuria - pt to see Nephrology PP  Reviewed PTL precautions and FKCs  F/u 2 weeks

## 2023-12-26 ENCOUNTER — ROUTINE PRENATAL (OUTPATIENT)
Dept: OBGYN CLINIC | Facility: CLINIC | Age: 28
End: 2023-12-26

## 2023-12-26 VITALS — WEIGHT: 180 LBS | DIASTOLIC BLOOD PRESSURE: 70 MMHG | BODY MASS INDEX: 28.19 KG/M2 | SYSTOLIC BLOOD PRESSURE: 120 MMHG

## 2023-12-26 DIAGNOSIS — O12.13 PROTEINURIA AFFECTING PREGNANCY IN THIRD TRIMESTER: Primary | ICD-10-CM

## 2023-12-26 DIAGNOSIS — Z3A.32 32 WEEKS GESTATION OF PREGNANCY: ICD-10-CM

## 2023-12-26 DIAGNOSIS — O09.293 HISTORY OF PRE-ECLAMPSIA IN PRIOR PREGNANCY, CURRENTLY PREGNANT, THIRD TRIMESTER: ICD-10-CM

## 2023-12-26 PROCEDURE — PNV: Performed by: PHYSICIAN ASSISTANT

## 2023-12-26 NOTE — PROGRESS NOTES
Patient is a 29 YO  female presenting to the office at 32.2 weeks for routine OB care.   BP: 120/70  TWlb  Fetal Movement: yes good movement   LOF: no  VB: no  CTX: no  Denies HA/blurry vision/visual changes  RSV brochure supplied to patient. Can receive at next visit if interested  Reviewed precautions  Call for concerns  RTO 2 weeks

## 2023-12-26 NOTE — PROGRESS NOTES
Routine prenatal 32 weeks. Pt is well, states there are no concerns at this time. Denies vb,lof, and ctx.

## 2024-01-10 ENCOUNTER — ROUTINE PRENATAL (OUTPATIENT)
Dept: OBGYN CLINIC | Facility: CLINIC | Age: 29
End: 2024-01-10

## 2024-01-10 VITALS — SYSTOLIC BLOOD PRESSURE: 110 MMHG | WEIGHT: 181 LBS | DIASTOLIC BLOOD PRESSURE: 66 MMHG | BODY MASS INDEX: 28.35 KG/M2

## 2024-01-10 DIAGNOSIS — Z3A.34 34 WEEKS GESTATION OF PREGNANCY: ICD-10-CM

## 2024-01-10 DIAGNOSIS — O12.13 PROTEINURIA AFFECTING PREGNANCY IN THIRD TRIMESTER: ICD-10-CM

## 2024-01-10 DIAGNOSIS — O09.293 HISTORY OF PRE-ECLAMPSIA IN PRIOR PREGNANCY, CURRENTLY PREGNANT, THIRD TRIMESTER: Primary | ICD-10-CM

## 2024-01-10 PROCEDURE — PNV

## 2024-01-10 NOTE — PROGRESS NOTES
Patient is a 29 YO  female presenting to the office at 34w3d for routine OB care.   Patient is feeling well today. Pt asymptomatic from recent COVID infection. Having more pelvic pressure, denies contractions.   She is discussing RSV vaccination with her partner, still unsure if she would like to receive this.   Had preeclampsia with prior pregnancy, she is concerned she will develop this again. Discussed warning signs with patient including persistent headache with visual changes, significant swelling especially of the face and neck, upper abdominal pain. Patient understands and will continue monitoring at home. Pt understands to call with any abnormal symptoms.   Fetal heart rate: 140  BP: 110/66  TW lb  Fetal Movement: yes  LOF: no  VB: no  CTX: no  Reviewed precautions  Call for concerns  RTO 2 weeks

## 2024-01-10 NOTE — PROGRESS NOTES
Mariela Mtz is 62atf3y, here for her routine ob appt; pt is having intermittent HA, pt states that she had covid 2 wks ago, cold like symptoms; pt also complaining of a slight rash on right hip and right upper arm. Pt is still thinking about the RSV vaccine, not ready to get today.    CTX/cramping:no, but feeling pressure in the pelvis.  LOF:  no  VB/spotting: no    +FM:  yes

## 2024-01-17 ENCOUNTER — ROUTINE PRENATAL (OUTPATIENT)
Dept: OBGYN CLINIC | Facility: CLINIC | Age: 29
End: 2024-01-17

## 2024-01-17 VITALS — SYSTOLIC BLOOD PRESSURE: 126 MMHG | WEIGHT: 183.4 LBS | BODY MASS INDEX: 28.72 KG/M2 | DIASTOLIC BLOOD PRESSURE: 78 MMHG

## 2024-01-17 DIAGNOSIS — Z36.9 ANTENATAL SCREENING ENCOUNTER: ICD-10-CM

## 2024-01-17 DIAGNOSIS — Z34.83 PRENATAL CARE, SUBSEQUENT PREGNANCY IN THIRD TRIMESTER: Primary | ICD-10-CM

## 2024-01-17 PROCEDURE — PNV: Performed by: PHYSICIAN ASSISTANT

## 2024-01-17 PROCEDURE — 87491 CHLMYD TRACH DNA AMP PROBE: CPT | Performed by: PHYSICIAN ASSISTANT

## 2024-01-17 PROCEDURE — 87150 DNA/RNA AMPLIFIED PROBE: CPT | Performed by: PHYSICIAN ASSISTANT

## 2024-01-17 PROCEDURE — 87591 N.GONORRHOEAE DNA AMP PROB: CPT | Performed by: PHYSICIAN ASSISTANT

## 2024-01-17 NOTE — PROGRESS NOTES
28 y.o.  female at 35w3d (Estimated Date of Delivery: 24) for PNV.    Pre-Latosha Vitals      Flowsheet Row Most Recent Value   Prenatal Assessment    Movement Present   Prenatal Vitals    Blood Pressure 126/78   Weight - Scale 83.2 kg (183 lb 6.4 oz)   Urine Albumin/Glucose    Dilation/Effacement/Station    Vaginal Drainage    Edema           TW.8 kg (30 lb 6.4 oz)    Leakage of fluid: no  Vaginal bleeding: no  Contractions/Cramping: no  Fetal movement: yes  Pt is feeling great overall  Occ HA  BPs have all been wnl  Pt knows to d/c ASA at 36 weeks  Gc/chlamyd and GBS cultures done today    RTO in 1 weeks.

## 2024-01-17 NOTE — PROGRESS NOTES
Mariela Mtz  is 1hrm3o, here for her routine ob appt:  pt is still having periodic headaches, pt does check BP at home, and has been 127/82.    CTX/cramping:  no  LOF:  no  VB/spotting:  no    +FM:  yes     UA neg/neg

## 2024-01-18 LAB
C TRACH DNA SPEC QL NAA+PROBE: NEGATIVE
N GONORRHOEA DNA SPEC QL NAA+PROBE: NEGATIVE

## 2024-01-19 LAB — GP B STREP DNA SPEC QL NAA+PROBE: NEGATIVE

## 2024-01-23 ENCOUNTER — ROUTINE PRENATAL (OUTPATIENT)
Dept: OBGYN CLINIC | Facility: CLINIC | Age: 29
End: 2024-01-23

## 2024-01-23 VITALS — DIASTOLIC BLOOD PRESSURE: 82 MMHG | SYSTOLIC BLOOD PRESSURE: 128 MMHG | BODY MASS INDEX: 28.66 KG/M2 | WEIGHT: 183 LBS

## 2024-01-23 DIAGNOSIS — Z3A.36 36 WEEKS GESTATION OF PREGNANCY: ICD-10-CM

## 2024-01-23 DIAGNOSIS — O12.13 PROTEINURIA AFFECTING PREGNANCY IN THIRD TRIMESTER: Primary | ICD-10-CM

## 2024-01-23 PROCEDURE — PNV: Performed by: OBSTETRICS & GYNECOLOGY

## 2024-01-23 NOTE — PROGRESS NOTES
Patient states she has been experience some left sided sciatic pain, lower abdominal pressure and a rash on her right side-mostly upper arm. She has also starting experiencing some BH contractions, but otherwise she is doing well. Urine dip neg/neg.

## 2024-01-23 NOTE — PROGRESS NOTES
28 y.o.   female at 36.2 wga for PNV. BP : 128/82. TW  + FM, - LOF, - Ctxn, - bleeding  Feeling well.  No complaints  GBS neg  Vtx by US  Proteinuria - continue to monitor BPs  F/u 1 week

## 2024-01-31 ENCOUNTER — ROUTINE PRENATAL (OUTPATIENT)
Dept: OBGYN CLINIC | Facility: CLINIC | Age: 29
End: 2024-01-31

## 2024-01-31 VITALS — BODY MASS INDEX: 28.82 KG/M2 | SYSTOLIC BLOOD PRESSURE: 122 MMHG | WEIGHT: 184 LBS | DIASTOLIC BLOOD PRESSURE: 76 MMHG

## 2024-01-31 DIAGNOSIS — Z3A.37 37 WEEKS GESTATION OF PREGNANCY: ICD-10-CM

## 2024-01-31 DIAGNOSIS — O09.293 HISTORY OF PRE-ECLAMPSIA IN PRIOR PREGNANCY, CURRENTLY PREGNANT, THIRD TRIMESTER: Primary | ICD-10-CM

## 2024-01-31 PROCEDURE — PNV

## 2024-01-31 NOTE — PROGRESS NOTES
Patient is a 27 YO  female presenting to the office at 37w3d for routine OB care.     Patient is feeling well today. Noticing increase in [place]-V[place] contractions. Patient is eager to deliver. Does not wish for IOL prior to 40-weeks.    SVE: 50/-2  Fetal heart rate: 135  BP: 122/76  TWlb  Fetal Movement: yes  LOF: no  VB: no  CTX: yes  Reviewed precautions  Call for concerns  RTO 1 weeks

## 2024-01-31 NOTE — PROGRESS NOTES
Mariela Mtz is 18gha1y, here for her routine ob appt, pt is feeling tightness, and pelvic pressure. Pt is feeling well just very emotional.  Pt would like to be checked today, she's very curious.

## 2024-02-07 ENCOUNTER — ROUTINE PRENATAL (OUTPATIENT)
Dept: OBGYN CLINIC | Facility: CLINIC | Age: 29
End: 2024-02-07

## 2024-02-07 VITALS — WEIGHT: 186 LBS | SYSTOLIC BLOOD PRESSURE: 120 MMHG | BODY MASS INDEX: 29.13 KG/M2 | DIASTOLIC BLOOD PRESSURE: 82 MMHG

## 2024-02-07 DIAGNOSIS — Z3A.38 38 WEEKS GESTATION OF PREGNANCY: ICD-10-CM

## 2024-02-07 DIAGNOSIS — O09.293 HISTORY OF PRE-ECLAMPSIA IN PRIOR PREGNANCY, CURRENTLY PREGNANT, THIRD TRIMESTER: Primary | ICD-10-CM

## 2024-02-07 DIAGNOSIS — O12.13 PROTEINURIA AFFECTING PREGNANCY IN THIRD TRIMESTER: ICD-10-CM

## 2024-02-07 PROCEDURE — PNV

## 2024-02-07 NOTE — PROGRESS NOTES
Patient is a 29 YO  female presenting to the office at 38w3d for routine OB care.   Patient is feeling well today. Feeling more pelvic pressure.   Fetal heart rate: 140  SVE: 3/70/-2  BP: 120/82  TWlb  Fetal Movement: yes  LOF: no  VB: no  CTX: yes, jesse-bowers inconsistent/irregular, multiple times per day.   Reviewed precautions  Call for concerns  RTO 1 weeks

## 2024-02-14 ENCOUNTER — NURSE TRIAGE (OUTPATIENT)
Dept: OTHER | Facility: OTHER | Age: 29
End: 2024-02-14

## 2024-02-14 ENCOUNTER — HOSPITAL ENCOUNTER (INPATIENT)
Facility: HOSPITAL | Age: 29
LOS: 2 days | Discharge: HOME | End: 2024-02-16
Attending: OBSTETRICS & GYNECOLOGY | Admitting: OBSTETRICS & GYNECOLOGY
Payer: COMMERCIAL

## 2024-02-14 ENCOUNTER — ANESTHESIA EVENT (INPATIENT)
Dept: ANESTHESIOLOGY | Facility: HOSPITAL | Age: 29
End: 2024-02-14
Payer: COMMERCIAL

## 2024-02-14 ENCOUNTER — ANESTHESIA (INPATIENT)
Dept: ANESTHESIOLOGY | Facility: HOSPITAL | Age: 29
End: 2024-02-14
Payer: COMMERCIAL

## 2024-02-14 PROBLEM — Z3A.39 39 WEEKS GESTATION OF PREGNANCY: Status: ACTIVE | Noted: 2023-10-05

## 2024-02-14 PROBLEM — O47.9 UTERINE CONTRACTIONS: Status: ACTIVE | Noted: 2024-02-14

## 2024-02-14 LAB
ABO GROUP BLD: NORMAL
ALBUMIN SERPL BCP-MCNC: 3.4 G/DL (ref 3.5–5)
ALP SERPL-CCNC: 234 U/L (ref 34–104)
ALT SERPL W P-5'-P-CCNC: 17 U/L (ref 7–52)
ANION GAP SERPL CALCULATED.3IONS-SCNC: 9 MMOL/L
AST SERPL W P-5'-P-CCNC: 22 U/L (ref 13–39)
BASE EXCESS BLDCOA CALC-SCNC: -3.1 MMOL/L (ref 3–11)
BASE EXCESS BLDCOV CALC-SCNC: -3.9 MMOL/L (ref 1–9)
BILIRUB SERPL-MCNC: 0.29 MG/DL (ref 0.2–1)
BLD GP AB SCN SERPL QL: NEGATIVE
BUN SERPL-MCNC: 14 MG/DL (ref 5–25)
CALCIUM ALBUM COR SERPL-MCNC: 9.2 MG/DL (ref 8.3–10.1)
CALCIUM SERPL-MCNC: 8.7 MG/DL (ref 8.4–10.2)
CHLORIDE SERPL-SCNC: 105 MMOL/L (ref 96–108)
CO2 SERPL-SCNC: 22 MMOL/L (ref 21–32)
CREAT SERPL-MCNC: 0.61 MG/DL (ref 0.6–1.3)
ERYTHROCYTE [DISTWIDTH] IN BLOOD BY AUTOMATED COUNT: 12.8 % (ref 11.6–15.1)
GFR SERPL CREATININE-BSD FRML MDRD: 122 ML/MIN/1.73SQ M
GLUCOSE SERPL-MCNC: 79 MG/DL (ref 65–140)
HCO3 BLDCOA-SCNC: 26.7 MMOL/L (ref 17.3–27.3)
HCO3 BLDCOV-SCNC: 23.8 MMOL/L (ref 12.2–28.6)
HCT VFR BLD AUTO: 37.4 % (ref 34.8–46.1)
HGB BLD-MCNC: 13 G/DL (ref 11.5–15.4)
MCH RBC QN AUTO: 31.3 PG (ref 26.8–34.3)
MCHC RBC AUTO-ENTMCNC: 34.8 G/DL (ref 31.4–37.4)
MCV RBC AUTO: 90 FL (ref 82–98)
O2 CT VFR BLDCOA CALC: 10 ML/DL
OXYHGB MFR BLDCOA: 39.3 %
OXYHGB MFR BLDCOV: 43.4 %
PCO2 BLDCOA: 66.4 MM[HG] (ref 30–60)
PCO2 BLDCOV: 52.7 MM HG (ref 27–43)
PH BLDCOA: 7.22 [PH] (ref 7.23–7.43)
PH BLDCOV: 7.27 [PH] (ref 7.19–7.49)
PLATELET # BLD AUTO: 259 THOUSANDS/UL (ref 149–390)
PMV BLD AUTO: 10.5 FL (ref 8.9–12.7)
PO2 BLDCOA: 21 MM HG (ref 5–25)
PO2 BLDCOV: 20.9 MM HG (ref 15–45)
POTASSIUM SERPL-SCNC: 3.8 MMOL/L (ref 3.5–5.3)
PROT SERPL-MCNC: 6.4 G/DL (ref 6.4–8.4)
RBC # BLD AUTO: 4.15 MILLION/UL (ref 3.81–5.12)
RH BLD: POSITIVE
SAO2 % BLDCOV: 10.6 ML/DL
SODIUM SERPL-SCNC: 136 MMOL/L (ref 135–147)
SPECIMEN EXPIRATION DATE: NORMAL
WBC # BLD AUTO: 12.54 THOUSAND/UL (ref 4.31–10.16)

## 2024-02-14 PROCEDURE — 59400 OBSTETRICAL CARE: CPT | Performed by: OBSTETRICS & GYNECOLOGY

## 2024-02-14 PROCEDURE — 0KQM0ZZ REPAIR PERINEUM MUSCLE, OPEN APPROACH: ICD-10-PCS | Performed by: OBSTETRICS & GYNECOLOGY

## 2024-02-14 PROCEDURE — 86901 BLOOD TYPING SEROLOGIC RH(D): CPT

## 2024-02-14 PROCEDURE — 86850 RBC ANTIBODY SCREEN: CPT

## 2024-02-14 PROCEDURE — 82805 BLOOD GASES W/O2 SATURATION: CPT

## 2024-02-14 PROCEDURE — 80053 COMPREHEN METABOLIC PANEL: CPT

## 2024-02-14 PROCEDURE — 86900 BLOOD TYPING SEROLOGIC ABO: CPT

## 2024-02-14 PROCEDURE — G0463 HOSPITAL OUTPT CLINIC VISIT: HCPCS

## 2024-02-14 PROCEDURE — 86780 TREPONEMA PALLIDUM: CPT

## 2024-02-14 PROCEDURE — NC001 PR NO CHARGE: Performed by: OBSTETRICS & GYNECOLOGY

## 2024-02-14 PROCEDURE — 99213 OFFICE O/P EST LOW 20 MIN: CPT

## 2024-02-14 PROCEDURE — 85027 COMPLETE CBC AUTOMATED: CPT

## 2024-02-14 RX ORDER — BUPIVACAINE HYDROCHLORIDE 2.5 MG/ML
30 INJECTION, SOLUTION EPIDURAL; INFILTRATION; INTRACAUDAL ONCE AS NEEDED
Status: DISCONTINUED | OUTPATIENT
Start: 2024-02-14 | End: 2024-02-15

## 2024-02-14 RX ORDER — SODIUM CHLORIDE, SODIUM LACTATE, POTASSIUM CHLORIDE, CALCIUM CHLORIDE 600; 310; 30; 20 MG/100ML; MG/100ML; MG/100ML; MG/100ML
125 INJECTION, SOLUTION INTRAVENOUS CONTINUOUS
Status: DISCONTINUED | OUTPATIENT
Start: 2024-02-14 | End: 2024-02-15

## 2024-02-14 RX ORDER — ONDANSETRON 2 MG/ML
4 INJECTION INTRAMUSCULAR; INTRAVENOUS EVERY 6 HOURS PRN
Status: DISCONTINUED | OUTPATIENT
Start: 2024-02-14 | End: 2024-02-15

## 2024-02-14 RX ORDER — OXYTOCIN/RINGER'S LACTATE 30/500 ML
PLASTIC BAG, INJECTION (ML) INTRAVENOUS
Status: COMPLETED
Start: 2024-02-14 | End: 2024-02-14

## 2024-02-14 RX ADMIN — ROPIVACAINE HYDROCHLORIDE: 2 INJECTION, SOLUTION EPIDURAL; INFILTRATION at 21:45

## 2024-02-14 RX ADMIN — ONDANSETRON 4 MG: 2 INJECTION INTRAMUSCULAR; INTRAVENOUS at 22:51

## 2024-02-14 RX ADMIN — SODIUM CHLORIDE, SODIUM LACTATE, POTASSIUM CHLORIDE, AND CALCIUM CHLORIDE 125 ML/HR: .6; .31; .03; .02 INJECTION, SOLUTION INTRAVENOUS at 22:10

## 2024-02-14 RX ADMIN — Medication 250 UNITS: at 23:34

## 2024-02-14 RX ADMIN — SODIUM CHLORIDE, SODIUM LACTATE, POTASSIUM CHLORIDE, AND CALCIUM CHLORIDE 999 ML/HR: .6; .31; .03; .02 INJECTION, SOLUTION INTRAVENOUS at 20:30

## 2024-02-15 LAB
ALBUMIN SERPL BCP-MCNC: 3.3 G/DL (ref 3.5–5)
ALP SERPL-CCNC: 196 U/L (ref 34–104)
ALT SERPL W P-5'-P-CCNC: 18 U/L (ref 7–52)
ANION GAP SERPL CALCULATED.3IONS-SCNC: 5 MMOL/L
AST SERPL W P-5'-P-CCNC: 26 U/L (ref 13–39)
BILIRUB SERPL-MCNC: 0.31 MG/DL (ref 0.2–1)
BUN SERPL-MCNC: 12 MG/DL (ref 5–25)
CALCIUM ALBUM COR SERPL-MCNC: 9.6 MG/DL (ref 8.3–10.1)
CALCIUM SERPL-MCNC: 9 MG/DL (ref 8.4–10.2)
CHLORIDE SERPL-SCNC: 106 MMOL/L (ref 96–108)
CO2 SERPL-SCNC: 27 MMOL/L (ref 21–32)
CREAT SERPL-MCNC: 0.66 MG/DL (ref 0.6–1.3)
ERYTHROCYTE [DISTWIDTH] IN BLOOD BY AUTOMATED COUNT: 13.1 % (ref 11.6–15.1)
GFR SERPL CREATININE-BSD FRML MDRD: 118 ML/MIN/1.73SQ M
GLUCOSE SERPL-MCNC: 101 MG/DL (ref 65–140)
HCT VFR BLD AUTO: 35.6 % (ref 34.8–46.1)
HGB BLD-MCNC: 12.2 G/DL (ref 11.5–15.4)
MCH RBC QN AUTO: 31.4 PG (ref 26.8–34.3)
MCHC RBC AUTO-ENTMCNC: 34.3 G/DL (ref 31.4–37.4)
MCV RBC AUTO: 92 FL (ref 82–98)
PLATELET # BLD AUTO: 252 THOUSANDS/UL (ref 149–390)
PMV BLD AUTO: 10.6 FL (ref 8.9–12.7)
POTASSIUM SERPL-SCNC: 3.7 MMOL/L (ref 3.5–5.3)
PROT SERPL-MCNC: 5.9 G/DL (ref 6.4–8.4)
RBC # BLD AUTO: 3.88 MILLION/UL (ref 3.81–5.12)
SODIUM SERPL-SCNC: 138 MMOL/L (ref 135–147)
TREPONEMA PALLIDUM IGG+IGM AB [PRESENCE] IN SERUM OR PLASMA BY IMMUNOASSAY: NORMAL
WBC # BLD AUTO: 12.77 THOUSAND/UL (ref 4.31–10.16)

## 2024-02-15 PROCEDURE — 99024 POSTOP FOLLOW-UP VISIT: CPT | Performed by: OBSTETRICS & GYNECOLOGY

## 2024-02-15 PROCEDURE — 80053 COMPREHEN METABOLIC PANEL: CPT

## 2024-02-15 PROCEDURE — 85027 COMPLETE CBC AUTOMATED: CPT

## 2024-02-15 RX ORDER — IBUPROFEN 600 MG/1
600 TABLET ORAL EVERY 6 HOURS
Status: DISCONTINUED | OUTPATIENT
Start: 2024-02-15 | End: 2024-02-16 | Stop reason: HOSPADM

## 2024-02-15 RX ORDER — BENZOCAINE/MENTHOL 6 MG-10 MG
1 LOZENGE MUCOUS MEMBRANE DAILY PRN
Status: DISCONTINUED | OUTPATIENT
Start: 2024-02-15 | End: 2024-02-16 | Stop reason: HOSPADM

## 2024-02-15 RX ORDER — SIMETHICONE 80 MG
80 TABLET,CHEWABLE ORAL 4 TIMES DAILY PRN
Status: DISCONTINUED | OUTPATIENT
Start: 2024-02-15 | End: 2024-02-16 | Stop reason: HOSPADM

## 2024-02-15 RX ORDER — ONDANSETRON 2 MG/ML
4 INJECTION INTRAMUSCULAR; INTRAVENOUS EVERY 8 HOURS PRN
Status: DISCONTINUED | OUTPATIENT
Start: 2024-02-15 | End: 2024-02-16 | Stop reason: HOSPADM

## 2024-02-15 RX ORDER — DIPHENHYDRAMINE HCL 25 MG
25 TABLET ORAL EVERY 6 HOURS PRN
Status: DISCONTINUED | OUTPATIENT
Start: 2024-02-15 | End: 2024-02-16 | Stop reason: HOSPADM

## 2024-02-15 RX ORDER — ACETAMINOPHEN 325 MG/1
650 TABLET ORAL EVERY 4 HOURS PRN
Status: DISCONTINUED | OUTPATIENT
Start: 2024-02-15 | End: 2024-02-16 | Stop reason: HOSPADM

## 2024-02-15 RX ORDER — DOCUSATE SODIUM 100 MG/1
100 CAPSULE, LIQUID FILLED ORAL 2 TIMES DAILY
Status: DISCONTINUED | OUTPATIENT
Start: 2024-02-15 | End: 2024-02-16 | Stop reason: HOSPADM

## 2024-02-15 RX ORDER — CALCIUM CARBONATE 500 MG/1
1000 TABLET, CHEWABLE ORAL DAILY PRN
Status: DISCONTINUED | OUTPATIENT
Start: 2024-02-15 | End: 2024-02-16 | Stop reason: HOSPADM

## 2024-02-15 RX ORDER — OXYTOCIN/RINGER'S LACTATE 30/500 ML
250 PLASTIC BAG, INJECTION (ML) INTRAVENOUS ONCE
Status: COMPLETED | OUTPATIENT
Start: 2024-02-15 | End: 2024-02-15

## 2024-02-15 RX ADMIN — HYDROCORTISONE 1 APPLICATION: 1 CREAM TOPICAL at 01:34

## 2024-02-15 RX ADMIN — IBUPROFEN 600 MG: 600 TABLET, FILM COATED ORAL at 17:15

## 2024-02-15 RX ADMIN — IBUPROFEN 600 MG: 600 TABLET, FILM COATED ORAL at 01:34

## 2024-02-15 RX ADMIN — DOCUSATE SODIUM 100 MG: 100 CAPSULE, LIQUID FILLED ORAL at 17:14

## 2024-02-15 RX ADMIN — WITCH HAZEL 1 PAD: 500 SOLUTION RECTAL; TOPICAL at 01:34

## 2024-02-15 RX ADMIN — DOCUSATE SODIUM 100 MG: 100 CAPSULE, LIQUID FILLED ORAL at 08:11

## 2024-02-15 RX ADMIN — BENZOCAINE AND LEVOMENTHOL 1 APPLICATION: 200; 5 SPRAY TOPICAL at 01:34

## 2024-02-15 NOTE — DISCHARGE INSTRUCTIONS
Self Care After Delivery   AMBULATORY CARE:   The postpartum period is the period of time from delivery to about 6 weeks. During this time you may experience many physical and emotional changes. It is important to understand what is normal and when you need to call your healthcare provider. It is also important to know how to care for yourself during this time.  Call your local emergency number (911 in the [place]) for any of the following:   You see or hear things that are not there, or have thoughts of harming yourself or your baby.     You soak through 1 pad in 15 minutes, have blurry vision, clammy or pale skin, and feel faint.     You faint or lose consciousness.     You have trouble breathing.     You cough up blood.     Your  incision comes apart.     Seek care immediately if:   Your heart is beating faster than usual.     You have a bad headache or changes in your vision.     Your perineal tear, episiotomy site, or  incision is red, swollen, bleeding, or draining pus.     You have severe abdominal pain.     Call your doctor or obstetrician if:   Your leg is painful, red, and larger than usual.     You soak through 1 or more pads in an hour, or pass blood clots larger than a quarter from your vagina.     You have a fever.     You have new or worsening pain in your abdomen or vagina.     You continue to have depression 1 to 2 weeks after you deliver.     You have trouble sleeping.     You have foul-smelling discharge from your vagina.     You have pain or burning when you urinate.     You do not have a bowel movement for 3 days or more.     You have nausea or are vomiting.     You have hard lumps or red streaks over your breasts.     You have cracked nipples or bleed from your nipples.     You have questions or concerns about your condition or care.     Physical changes: The following are normal changes after you give birth:  Pain in the area between your anus and vagina     Breast pain      Constipation or hemorrhoids     Hot or cold flashes     Vaginal bleeding or discharge     Mild to moderate abdominal cramping     Difficulty controlling bowel movements or urine     Emotional changes: A drop in hormone levels after you deliver may cause changes in your emotions. You may feel irritable, sad, or anxious. You may cry easily or for no reason. You may also feel depressed. Depression that continues can be a sign of postpartum depression, a condition that can be treated. Treatment may include talk therapy, medicines, or both. Healthcare providers will ask how you are feeling and if you have any depression. These talks can happen during appointments for your medical care and for your baby's care, such as well child visits. Providers can help you find ways to care for yourself and your baby. Talk to your providers about the following:  When emotional changes or depression started, and if it is getting worse over time     Problems you are having with daily activities, sleep, or caring for your baby     If anything makes you feel worse, or makes you feel better     Feeling that you are not bonding with your baby the way you want     Any problems your baby has with sleeping or feeding     Your baby is fussy or cries a lot     Support you have from friends, family, or others     Breast care for breastfeeding mothers: You may have sore breasts for 3 to 6 days after you give birth. This happens as your milk begins to fill your breasts. You may also have sore breasts if you do not breastfeed frequently. Do the following to care for your breasts:  Apply a moist, warm, compress to your breast as directed. This may help soothe your breasts. Make sure the washcloth is not too hot before you apply it to your breast.     Nurse your baby or pump your milk frequently. This may prevent clogged milk ducts. Ask your healthcare provider how often to nurse or pump.     Massage your breasts as directed. This may help increase  your milk flow. Gently rub your breasts in a circular motion before you breastfeed. You may need to gently squeeze your breast or nipple to help release milk. You can also use a breast pump to help release milk from your breast.     Wash your breasts with warm water only. Do not put soap on your nipples. Soap may cause your nipples to become dry.     Apply lanolin cream to your nipples as directed. Lanolin cream may add moisture to your skin and prevent nipple dryness. Always wash off lanolin cream with warm water before you breastfeed.     Place pads in your bra. Your nipples may leak milk when you are not breastfeeding. You can place pads inside of your bra to help prevent leaking onto your clothing. Ask your healthcare provider where to purchase bra pads.     Get breastfeeding support if needed. Healthcare providers can answer questions about breastfeeding and provide you with support. Ask your healthcare provider who you can contact if you need breastfeeding support.     Breast care for non-breastfeeding mothers: Milk will fill your breasts even if you bottle feed your baby. Do the following to help stop your milk from filling your breasts and causing pain:  Wear a bra with support at all times. A sports bra or a tight-fitting bra will help stop your milk from coming in.     Apply ice on each breast for 15 to 20 minutes every hour or as directed. Use an ice pack, or put crushed ice in a plastic bag. Cover it with a towel before you apply it to your breast. Ice helps your milk ducts shrink.     Keep your breasts away from warm water. Warm water will make it easier for milk to fill your breasts. Stand with your breasts away from warm water in the shower.     Limit how much you touch your breasts. This will prevent them from filling with milk.     Perineum care: Your perineum is the area between your rectum and vagina. It is normal to have swelling and pain in this area after you give birth. If you had an  episiotomy, your healthcare provider may give you special instructions.  Clean your perineum after you use the bathroom. This may prevent infection and help with healing. Use a spray bottle with warm water to clean your perineum. You may also gently spray warm water against your perineum when you urinate. Always wipe front to back.     Take a sitz bath as directed. A sitz bath may help relieve swelling and pain. Fill your bath tub or bucket with water up to your hips and sit in the water. Use cold water for 2 days after you deliver. Then use warm water. Ask your healthcare provider for more information about a sitz bath.     Apply ice packs for the first 24 hours or as directed. Use a plastic glove filled with ice or buy an ice pack. Wrap the ice pack or plastic glove in a small towel or wash cloth. Place the ice pack on your perineum for 20 minutes at a time.     Sit on a donut-shaped pillow. This may relieve pressure on your perineum when you sit.     Use wipes that contain medicine or take pills as directed. Your healthcare provider may tell you to use witch hazel pads. You can place witch hazel pads in the refrigerator before you apply them to your perineum. Your provider may also tell you to take NSAIDs. Ask him or her how often to take pills or use the wipes.     Do not go swimming or take tub baths for 4 to 6 weeks or as directed. This will help prevent an infection in your vagina or uterus.     Bowel and bladder care: It may take 3 to 5 days to have a bowel movement after you deliver your baby. You can do the following to prevent or manage constipation, and get control of your bowel or bladder:  Take stool softeners as directed. A stool softener is medicine that will make your bowel movements softer. This may prevent or relieve constipation. A stool softener may also make bowel movements less painful.     Drink plenty of liquids. Ask how much liquid to drink each day and which liquids are best for you.  Liquids may help prevent constipation.     Eat foods high in fiber. Examples include fruits, vegetables, grains, beans, and lentils. Ask your healthcare provider how much fiber you need each day. Fiber may prevent constipation.          Do Kegel exercises as directed. Kegel exercises will help strengthen the muscles that control bowel movements and urination. Ask your healthcare provider for more information on Kegel exercises.     Apply cold compresses or medicine to hemorrhoids as directed. This may relieve swelling and pain. Your healthcare provider may tell you to apply ice or wipes that contain medicine to your hemorrhoids. He or she may also tell you to use a sitz bath. Ask your provider for more information on how to manage hemorrhoids.     Nutrition: Good nutrition is important in the postpartum period. It will help you return to a healthy weight, increase your energy levels, and prevent constipation. It will also help you get enough nutrients and calories if you are going to breastfeed your baby.  Eat a variety of healthy foods. Healthy foods include fruits, vegetables, whole-grain breads, low-fat dairy products, beans, lean meats, and fish. You may need 500 to 700 extra calories each day if you breastfeed your baby. You may also need extra protein.          Limit foods with added sugar and high amounts of fat. These foods are high in calories and low in healthy nutrients. Read food labels so you know how much sugar and fat is in the food you want to eat.     Drink 8 to 10 glasses of water per day. Water will help you make plenty of milk for your baby. It will also help prevent constipation. Drink a glass of water every time you breastfeed your baby.     Take vitamins as directed. Ask your healthcare provider what vitamins you need.     Limit caffeine and alcohol if you are breastfeeding. Caffeine and alcohol can get into your breast milk. Caffeine and alcohol can make your baby fussy. They can also  interfere with your baby's sleep. Ask your healthcare provider if you can drink alcohol or caffeine.     Rest and sleep: You may feel very tired in the postpartum period. Enough sleep will help you heal and give you energy to care for your baby. The following may help you get sleep and rest:  Nap when your baby naps. Your baby may nap several times during the day. Get rest during this time.     Limit visitors. Many people may want to see you and your baby. Ask friends or family to visit on different days. This will give you time to rest.     Do not plan too much for one day. Put off household chores so that you have time to rest. Gradually do more each day.     Ask for help from family, friends, or neighbors. Ask them to help you with laundry, cleaning, or errands. Also ask someone to watch the baby while you take a nap or relax. Ask your partner to help with the care of your baby. Pump some of your breast milk so your partner can feed your baby during the night.     Exercise after delivery: Wait until your healthcare provider says it is okay to exercise. Exercise can help you lose weight, increase your energy levels, and manage your mood. It can also prevent constipation and blood clots. Start with gentle exercises such as walking. Do more as you have more energy. You may need to avoid abdominal exercises for 1 to 2 weeks after you deliver. Talk to your healthcare provider about an exercise plan that is right for you.     Sexual activity after delivery:   Do not have sex until your healthcare provider says it is okay. You may need to wait 4 to 6 weeks before you have sex. This may prevent infection and allow time to heal.     Your menstrual cycle may begin as soon as 3 weeks after you deliver. Your period may be delayed if you breastfeed your baby. You can become pregnant before you get your first postpartum period. Talk to your healthcare provider about birth control that is right for you. Some types of birth  control are not safe during breastfeeding.     For support and more information: Join a support group for new mothers. Ask for help from family and friends with chores, errands, and care of your baby.  Office of Women's Health,  Department of Health and Human Services  [address] [place] [address]  [address] [place] [address]  Phone: [phone]  Web Address: www.womenshealth.gov  [facility]partum [facility]  [address] [place] [address]  Web Address: [email or web address]  Follow up with your doctor or obstetrician as directed: You will need to follow up within 2 to 6 weeks of delivery. Write down your questions so you remember to ask them at your visits.  © Copyright [employer] 2020 Information is for End User's use only and may not be sold, redistributed or otherwise used for commercial purposes. All illustrations and images included in CareNotes® are the copyrighted property of [employer] or [employer]  The above information is an  only. It is not intended as medical advice for individual conditions or treatments. Talk to your doctor, nurse or pharmacist before following any medical regimen to see if it is safe and effective for you.

## 2024-02-15 NOTE — L&D DELIVERY NOTE
Vaginal Delivery Summary - OB/GYN   Mariela Mtz 28 y.o. female MRN: [id number]  Unit/Bed#: -01 Encounter: [id number]    Pre-delivery Diagnosis:   Pregnancy at 39w3d   Proteinuria in pregnancy  History of preeclampsia    Post-delivery Diagnosis: same, delivered    Procedure: Spontaneous Vaginal Delivery     OBGYN Practice: [facility]    Attending Physician: Dr. Evelyn Valenzuela  Resident Physician: Dr. Claudine Harper  Other Assistant: Dr. Jacklyn Chand    Anesthesia: Epidural ,     QBL: Non-Surgical QBL (mL): 29        Complications: none apparent    Specimens:   1. Arterial and venous cord gases  2. Cord blood  3. Segment of umbilical cord  4. Placenta to storage     Findings:  1. Viable male  on 2024  11:22 PM  via Vaginal, Spontaneous   with APGAR scores of 9  and 9  at 1 and 5 minutes, respectively. Weight pending at time of dictation for skin to skin bonding.  2. Spontaneous delivery of intact placenta at 2327  3. 2 degree laceration repaired with 3-0 Vicryl Rapide      Gases:  Umbilical Artery  Recent Labs     24  2328   PHCART 7.222*   BECART -3.1*       Umbilical Vein  Recent Labs     24  2328   PHCVEN 7.272   BECVEN -3.9*         Brief history and labor course:  Mariela Mtz is a 28 y.o. [id number] 39w3d . She presented to labor and delivery for painful contractions. Her pregnancy was complicated by proteinuria and she had a history of preeclampsia in her prior pregnancy. On exam in triage she was noted to be 6/80/0. She was admitted for labor and received an epidural for pain control. She progressed to 9/90/+1 and amniotomy was performed for clear fluid. She progressed to complete and started pushing.    Description of delivery:    After pushing for 5 minutes, Mariela delivered a viable male , weight pending as mother is doing skin to skin bonding. The fetal vertex delivered HELENA position spontaneously. There was one loop of nuchal cord which was  delivered through. The anterior shoulder delivered atraumatically with maternal expulsive forces and the assistance of gentle downward traction. The posterior shoulder delivered with maternal expulsive forces and the assistance of gentle upward traction. The remainder of the fetus delivered spontaneously and the nuchal cord was reduced at the time of delivery.     Upon delivery, the infant was placed on Mariela's abdomen and the cord was doubly clamped and cut. Delayed cord clamping was achieved. The infant was noted to cry spontaneously and was moving all extremities appropriately. There was no evidence for injury. Awaiting nurse resuscitators evaluated the . Arterial and venous cord blood gases and cord blood was collected for analysis. These were promptly sent to the lab. In the immediate post-partum, active management of the 3rd stage of labor was performed with massage, the administration of 30 units of IV pitocin, and gentle traction on the umbilical cord. The placenta delivered spontaneously and was noted to have a centrally inserted 3 vessel cord.  The placenta was sent to storage.     Laceration Repair  The vagina, cervix, perineum, and rectum were inspected and there was noted to be a second degree perineal laceration.    The patient was comfortable with epidural for analgesia. The vaginal mucosa and submucosa were then re approximated using 3-0 vicryl rapide to the point of the hymenal ring. The superficial perineal fascia was then re approximated using 3-0 vicryl in a running non locked stitch downward followed by a running subcuticular stitch upward to the level of the introitus.    At the conclusion of the procedure, all needle, sponge, and instrument counts were noted to be correct. Dr. Valenzuela was present and participated in all key portions of the case.    Disposition:  The patient and the  both tolerated the procedure well and are recovering in labor and delivery room       Claudine  MD Leroy  PGY 1, Obstetrics and Gynecology  2/15/2024  12:18 AM

## 2024-02-15 NOTE — TELEPHONE ENCOUNTER
"Reason for Disposition  • [1] History of rapid prior delivery AND [2] contractions < 10 minutes apart    Answer Assessment - Initial Assessment Questions  1. ONSET: \"When did the symptoms begin?\"         2.5 hrs ago    2. CONTRACTIONS: \"Describe the contractions that you are having.\" (e.g., duration, frequency, regularity, severity)      Strong 10 min apart for 30-40 sec    3. MONICA: \"What date are you expecting to deliver?\"      39w3d  4. PARITY: \"Have you had a baby before?\" If Yes, ask: \"How long did the labor last?\"        5. FETAL MOVEMENT: \"Has the baby's movement decreased or changed significantly from normal?\"      Moving well  6. OTHER SYMPTOMS: \"Do you have any other symptoms?\" (e.g., leaking fluid from vagina, vaginal bleeding, fever, hand/facial swelling)      Mucous plug blood tinged this am    Protocols used: Pregnancy - Labor-ADULT-AH    "

## 2024-02-15 NOTE — PLAN OF CARE
Problem: BIRTH - VAGINAL/ SECTION  Goal: Fetal and maternal status remain reassuring during the birth process  Description: INTERVENTIONS:  - Monitor vital signs  - Monitor fetal heart rate  - Monitor uterine activity  - Monitor labor progression (vaginal delivery)  - DVT prophylaxis  - Antibiotic prophylaxis  Outcome: Progressing  Goal: Emotionally satisfying birthing experience for mother/fetus  Description: Interventions:  - Assess, plan, implement and evaluate the nursing care given to the patient in labor  - Advocate the philosophy that each childbirth experience is a unique experience and support the family's chosen level of involvement and control during the labor process   - Actively participate in both the patient's and family's teaching of the birth process  - Consider cultural, Evangelical and age-specific factors and plan care for the patient in labor  Outcome: Progressing     Problem: PAIN - ADULT  Goal: Verbalizes/displays adequate comfort level or baseline comfort level  Description: Interventions:  - Encourage patient to monitor pain and request assistance  - Assess pain using appropriate pain scale  - Administer analgesics based on type and severity of pain and evaluate response  - Implement non-pharmacological measures as appropriate and evaluate response  - Consider cultural and social influences on pain and pain management  - Notify physician/advanced practitioner if interventions unsuccessful or patient reports new pain  Outcome: Progressing     Problem: INFECTION - ADULT  Goal: Absence or prevention of progression during hospitalization  Description: INTERVENTIONS:  - Assess and monitor for signs and symptoms of infection  - Monitor lab/diagnostic results  - Monitor all insertion sites, i.e. indwelling lines, tubes, and drains  - Monitor endotracheal if appropriate and nasal secretions for changes in amount and color  - [place] appropriate cooling/warming therapies per order  -  Administer medications as ordered  - Instruct and encourage patient and family to use good hand hygiene technique  - Identify and instruct in appropriate isolation precautions for identified infection/condition  Outcome: Progressing  Goal: Absence of fever/infection during neutropenic period  Description: INTERVENTIONS:  - Monitor WBC    Outcome: Progressing     Problem: SAFETY ADULT  Goal: Patient will remain free of falls  Description: INTERVENTIONS:  - Educate patient/family on patient safety including physical limitations  - Instruct patient to call for assistance with activity   - Consult OT/PT to assist with strengthening/mobility   - Keep Call bell within reach  - Keep bed low and locked with side rails adjusted as appropriate  - Keep care items and personal belongings within reach  - Initiate and maintain comfort rounds  - Make Fall Risk Sign visible to staff  - Apply yellow socks and bracelet for high fall risk patients  - Consider moving patient to room near nurses station  Outcome: Progressing  Goal: Maintain or return to baseline ADL function  Description: INTERVENTIONS:  -  Assess patient's ability to carry out ADLs; assess patient's baseline for ADL function and identify physical deficits which impact ability to perform ADLs (bathing, care of mouth/teeth, toileting, grooming, dressing, etc.)  - Assess/evaluate cause of self-care deficits   - Assess range of motion  - Assess patient's mobility; develop plan if impaired  - Assess patient's need for assistive devices and provide as appropriate  - Encourage maximum independence but intervene and supervise when necessary  - Involve family in performance of ADLs  - Assess for home care needs following discharge   - Consider OT consult to assist with ADL evaluation and planning for discharge  - Provide patient education as appropriate  Outcome: Progressing  Goal: Maintains/Returns to pre admission functional level  Description: INTERVENTIONS:  - Perform  AM-PAC 6 Click Basic Mobility/ Daily Activity assessment daily.  - Set and communicate daily mobility goal to care team and patient/family/caregiver.   - Collaborate with rehabilitation services on mobility goals if consulted  - Out of bed for toileting  - Record patient progress and toleration of activity level   Outcome: Progressing     Problem: Knowledge Deficit  Goal: Patient/family/caregiver demonstrates understanding of disease process, treatment plan, medications, and discharge instructions  Description: Complete learning assessment and assess knowledge base.  Interventions:  - Provide teaching at level of understanding  - Provide teaching via preferred learning methods  Outcome: Progressing     Problem: DISCHARGE PLANNING  Goal: Discharge to home or other facility with appropriate resources  Description: INTERVENTIONS:  - Identify barriers to discharge w/patient and caregiver  - Arrange for needed discharge resources and transportation as appropriate  - Identify discharge learning needs (meds, wound care, etc.)  - Arrange for interpretive services to assist at discharge as needed  - Refer to Case Management Department for coordinating discharge planning if the patient needs post-hospital services based on physician/advanced practitioner order or complex needs related to functional status, cognitive ability, or social support system  Outcome: Progressing

## 2024-02-15 NOTE — DISCHARGE SUMMARY
"Discharge Summary - Mariela Mtz 28 y.o. female MRN: [id number]    Unit/Bed#: -01 Encounter: [id number]    ADMISSION  Admission Date: 2024   Admitting Attending: Dr. Evelyn Miller*  Admitting Diagnoses:   Patient Active Problem List   Diagnosis    Proteinuria affecting pregnancy in third trimester    History of pre-eclampsia in prior pregnancy, currently pregnant    39 weeks gestation of pregnancy    History of pre-eclampsia in prior pregnancy, currently pregnant, third trimester    Uterine contractions       DELIVERY  Delivery Method: Vaginal, Spontaneous    Delivery Date and Time: 2024  11:22 PM   Delivery Attending: Evelyn Valenzuela     DISCHARGE  Discharge Date: ***  Discharge Attending:  ***  Discharge Diagnosis:   Same, Delivered  ***  Clinical course: Admission to Delivery  Mariela Mtz is a 28 y.o. [id number] 39w3d . She presented to labor and delivery for painful contractions. Her pregnancy was complicated by proteinuria and she had a history of preeclampsia in her prior pregnancy. On exam in triage she was noted to be 6/80/0. She was admitted for labor and received an epidural for pain control. She progressed to 9/90/+1 and amniotomy was performed for clear fluid. She progressed to complete and started pushing.       Delivery  Route of Delivery: Vaginal, Spontaneous       Anesthesia: Epidural ,   QBL:        QBL:        Delivery: Vaginal, Spontaneous  at 2024  11:22 PM   Laceration: Perineal: 2°  Repaired? Yes     Baby's Weight:  ;      Apgar scores: 9  and 9  at 1 and 5 minutes, respectively      Clinical Course: Post-Delivery:  The post delivery course was {beounremarkable:72752::\"unremarkable.\"}    On the day of discharge, the patient was ambulating, voiding spontaneously, tolerating oral intake, and hemodynamically stable. She was able to reasonably perform all ADLs. She had appropriate bowel function. Pain was well-controlled. She was discharged home on " postpartum/postop day #*** without complications***. Patient was instructed to follow up with her OB as an outpatient and was given appropriate warnings to call her provider with problems or concerns.    Pertinent lab findings included:   Blood type {reuben:11220}***.     Last three Hgb values:  Lab Results   Component Value Date    HGB 13.0 02/14/2024    HGB 12.5 11/25/2023    HGB 12.5 08/07/2023        Problem-specific follow-up plans included the following:  Problem List       Proteinuria affecting pregnancy in third trimester    Overview     Elevated PC ratio x 2 at 0.52 and 0.32 and elevated 24-hour urine protein confirms proteinuria in pregnancy.  Urine culture was negative.  Recommend nephrology workup postnatally if 24-hour urine protein after pregnancy continues to be abnormal.  She is on baby aspirin to lessen the risk for preeclampsia.          Current Assessment & Plan     Elevated PC ratio x 2 at 0.52 and 0.32 and elevated 24-hour urine protein confirms proteinuria in pregnancy. Urine culture was negative. Recommend nephrology workup postnatally if 24-hour urine protein after pregnancy continues to be abnormal. She is on baby aspirin to lessen the risk for preeclampsia.         History of pre-eclampsia in prior pregnancy, currently pregnant    Current Assessment & Plan     F/u PreE labs         39 weeks gestation of pregnancy    History of pre-eclampsia in prior pregnancy, currently pregnant, third trimester    * (Principal) Uterine contractions    Current Assessment & Plan     Admit to L&D  CBC, RPR, Type & Screen  SVE: 6/90/-2  FHT: category I  Clinical EFW: 7lbs ; Vertex confirmed by pelvic exam  GBS status: negative   Analgesia at maternal request  Expectant management              Discharge med list:  Contraception: ***     Medication List      ASK your doctor about these medications     acetaminophen 325 mg tablet; Commonly known as: TYLENOL   aspirin 81 mg chewable tablet; Chew 2 tablets (162  "mg total) daily   PRENATAL 1 PO       Condition at discharge:   {condition:91536}     Disposition:   {Discharge Disposition:11007}    Planned Readmission:   {EXAM; YES/NO:64723::\"No\"}      "

## 2024-02-15 NOTE — H&P
H&P Exam - Obstetrics   Mariela Mtz 28 y.o. female MRN: [id number]  Unit/Bed#: LD TRIAGE 2 Encounter: [id number]      ASSESSMENT:  28 y.o.yo  at 39w3d weeks gestation who is being admitted for active labor.  EFW: 7lbs  VTX noted on pelvic exam    PLAN:    History of pre-eclampsia in prior pregnancy, currently pregnant  Assessment & Plan  F/u PreE labs    Proteinuria affecting pregnancy in third trimester  Assessment & Plan  Elevated PC ratio x 2 at 0.52 and 0.32 and elevated 24-hour urine protein confirms proteinuria in pregnancy. Urine culture was negative. Recommend nephrology workup postnatally if 24-hour urine protein after pregnancy continues to be abnormal. She is on baby aspirin to lessen the risk for preeclampsia.    * Uterine contractions  Assessment & Plan  Admit to L&D  CBC, RPR, Type & Screen  SVE:   FHT: category I  Clinical EFW: 7lbs ; Vertex confirmed by pelvic exam  GBS status: negative   Analgesia at maternal request  Expectant management       Discussed with Dr. Valenzuela      This patient will be an INPATIENT  and I certify the anticipated length of stay is >2 Midnights.      History of Present Illness     Chief Complaint: Active labor    HPI:  Mariela Mtz is a 28 y.o.  female with an MONICA of 2024, by Last Menstrual Period at 39w3d weeks gestation who is being admitted for active labor. She complains of contractions that have worsened throughout the day and have become more frequent ([id number]). Her pregnancy has been complicated by proteinuria in pregnancy, and h/o preeclampsia. .    Contractions: yes  Loss of fluid: no  Vaginal bleeding: no  Fetal movement: yes    She is [facility] patient.     PREGNANCY COMPLICATIONS:   1) Pregnancy at 39w3d  2) Proteinuria in pregnancy  3) H/o preE in pregnancy    OB History    Para Term  AB Living   2 1 1 0   1   SAB IAB Ectopic Multiple Live Births           1      # Outcome Date GA Lbr Freddy/2nd Weight Sex Delivery Anes PTL  "Lv   2 Current            1 Term 03/18/22 39w0d  2977 g (6 lb 9 oz) M Vag-Spont EPI  SINGH       Baby complications/comments: none    Review of Systems   Constitutional:  Negative for chills and fever.   HENT:  Negative for ear pain and sore throat.    Eyes:  Negative for pain and visual disturbance.   Respiratory:  Negative for cough and shortness of breath.    Cardiovascular:  Negative for chest pain and palpitations.   Gastrointestinal:  Positive for abdominal pain. Negative for vomiting.        Ctx pain   Genitourinary:  Negative for dysuria and hematuria.   Musculoskeletal:  Negative for arthralgias and back pain.   Skin:  Negative for color change and rash.   Neurological:  Negative for seizures and syncope.   All other systems reviewed and are negative.        Historical Information   Past Medical History:   Diagnosis Date    Preeclampsia      Past Surgical History:   Procedure Laterality Date    WISDOM TOOTH EXTRACTION       Social History   Social History     Substance and Sexual Activity   Alcohol Use Not Currently     Social History     Substance and Sexual Activity   Drug Use Never     Social History     Tobacco Use   Smoking Status Never   Smokeless Tobacco Never     Family History: non-contributory    Meds/Allergies      Medications Prior to Admission   Medication    acetaminophen (TYLENOL) 325 mg tablet    aspirin 81 mg chewable tablet    Prenatal MV-Min-Fe Fum-FA-DHA (PRENATAL 1 PO)        Allergies   Allergen Reactions    Cat Hair Extract Cough, Hives, Itching, Shortness Of Breath and Wheezing       OBJECTIVE:    Vitals: Blood pressure 146/84, pulse 62, temperature 98.3 °F (36.8 °C), temperature source Axillary, resp. rate 20, height 5' 7\" (1.702 m), weight 84.4 kg (186 lb), last menstrual period 05/14/2023, SpO2 97%.Body mass index is 29.13 kg/m².    Physical Exam  Vitals reviewed.   Constitutional:       Appearance: Normal appearance.   HENT:      Head: Normocephalic and atraumatic.   Eyes:      " "Extraocular Movements: Extraocular movements intact.   Cardiovascular:      Rate and Rhythm: Normal rate and regular rhythm.      Pulses: Normal pulses.   Pulmonary:      Effort: Pulmonary effort is normal. No respiratory distress.   Abdominal:      Palpations: Abdomen is soft.      Tenderness: There is no abdominal tenderness.      Comments: Gravid uterus   Musculoskeletal:         General: Normal range of motion.      Cervical back: Normal range of motion.   Skin:     General: Skin is warm and dry.   Neurological:      Mental Status: She is alert.   Psychiatric:         Mood and Affect: Mood normal.         Behavior: Behavior normal.       Cervix:  Cervical Dilation: 6  Cervical Effacement: 90  Cervical Consistency: Soft  Fetal Station: -2  Presentation: Vertex  Method: Manual  OB Examiner: anant    Fetal heart rate:   Baseline Rate (FHR): 144 bpm  Category I    [place]:    Q2-3 mins    GBS: negative    Prenatal Labs: I have personally reviewed pertinent reports.  , Blood Type:   Lab Results   Component Value Date/Time    ABO Grouping B 08/07/2023 07:33 AM     , D (Rh type):   Lab Results   Component Value Date/Time    Rh Factor Positive 08/07/2023 07:33 AM     , Antibody Screen: No results found for: \"ANTIBODYSCR\" , HCT/HGB:   Lab Results   Component Value Date/Time    Hematocrit 37.4 02/14/2024 08:35 PM    Hemoglobin 13.0 02/14/2024 08:35 PM      , MCV:   Lab Results   Component Value Date/Time    MCV 90 02/14/2024 08:35 PM      , Platelets:   Lab Results   Component Value Date/Time    Platelets 259 02/14/2024 08:35 PM      , 1 hour Glucola:   Lab Results   Component Value Date/Time    Glucose 95 11/25/2023 11:54 AM   , Varicella:   Lab Results   Component Value Date/Time    Varicella IgG IMMUNE 08/07/2023 07:33 AM       , Rubella:   Lab Results   Component Value Date/Time    Rubella IgG Quant 16.0 08/07/2023 07:33 AM        , VDRL/RPR: No results found for: \"RPR\"   , Urine Culture/Screen:   Lab Results " "  Component Value Date/Time    Urine Culture 50,000-59,000 cfu/ml Lactobacillus species (A) 11/25/2023 11:00 AM       , Hep B:   Lab Results   Component Value Date/Time    Hepatitis B Surface Ag Non-reactive 08/07/2023 07:33 AM     , Hep C: No components found for: \"HEPCSAG\", \"EXTHEPCSAG\"   , HIV: No results found for: \"HIVAGAB\"  , Chlamydia: No results found for: \"EXTCHLAMYDIA\"  , Gonorrhea:   Lab Results   Component Value Date/Time    N gonorrhoeae, DNA Probe Negative 01/17/2024 09:52 AM     , Group B Strep:    Lab Results   Component Value Date/Time    Strep Grp B PCR Negative 01/17/2024 09:52 AM          Invasive Devices       None                       Edilma Gloria MD  OBGYN PGY-3  2/14/2024  8:32 PM  "

## 2024-02-15 NOTE — UTILIZATION REVIEW
"NOTIFICATION OF INPATIENT ADMISSION   MATERNITY/DELIVERY AUTHORIZATION REQUEST   SERVICING FACILITY:   [facility]  Parent Child Health - L&D, [place], NICU  [address] [place] [address]  Tax ID: [id number]  NPI: [id number]   ATTENDING PROVIDER:  Attending Name and NPI#: Evelyn Valenzuela Md [[id number]]  Address: [address] [place] [address]  Phone: [phone]   ADMISSION INFORMATION:  Place of Service: Inpatient [facility]  Place of Service Code: 21  Inpatient Admission Date/Time: 24  8:29 PM  Discharge Date/Time: No discharge date for patient encounter.  Admitting Diagnosis Code/Description:  Uterine contractions during pregnancy [O47.9]  39 weeks gestation of pregnancy [Z3A.39]  Encounter for full-term uncomplicated delivery [O80]     Mother: Mariela Mtz 1995 Estimated Date of Delivery: 24  Delivering clinician: Evelyn Valenzuela    OB History          2    Para   2    Term   2       0    AB        Living   2         SAB        IAB        Ectopic        Multiple   0    Live Births   2               [place] Name & MRN:   Information for the patient's :  Smith, Baby Boy (Mariela) [[id number]]    Delivery Information:  Sex: male  Delivered 2024 11:22 PM by Vaginal, Spontaneous; Gestational Age: 39w3d    [place] Measurements:  Weight: 6 lb 15.2 oz (3152 g);  Height: 20.5\"    APGAR 1 minute 5 minutes 10 minutes   Totals: 9 9       UTILIZATION REVIEW CONTACT:  Uyen Krueger, Utilization   Network Utilization Review Department  Phone: [phone]  Fax [phone]  Email: [email or web address]  Contact for approvals/pending authorizations, clinical reviews, and discharge.     PHYSICIAN ADVISORY SERVICES:  Medical Necessity Denial & Utrn-et-Vvmh Review  Phone: [phone]  Fax: [phone]  Email: [email or web address]     DISCHARGE SUPPORT TEAM:  For Patients " Discharge Needs & Updates  Phone: [phone] opt. 2 Fax: [phone]  Email: [email or web address]

## 2024-02-15 NOTE — ASSESSMENT & PLAN NOTE
Patient is a 28 y.o.  PPD#1 s/p  at 39w3d currently doing well, without concern this morning.  She does not have a current plan for contraception.    Plan:  -Routine postpartum care  -Encourage ambulation  -Encourage familial bonding  -Lactation support as needed  -Pain: Motrin/Tylenol around the clock  -Contraception plan: barrier protection; will discuss further options  -Likely discharge later this afternoon

## 2024-02-15 NOTE — ANESTHESIA POSTPROCEDURE EVALUATION
Post-Op Assessment Note    CV Status:  Stable    Pain management: adequate      Post-op block assessment: catheter intact   Mental Status:  Alert   Hydration Status:  Stable   PONV Controlled:  None   Airway Patency:  Patent    No anethesia notable event occurred.    Staff: Anesthesiologist               BP      Temp      Pulse     Resp      SpO2

## 2024-02-15 NOTE — PLAN OF CARE
Problem: PAIN - ADULT  Goal: Verbalizes/displays adequate comfort level or baseline comfort level  Description: Interventions:  - Encourage patient to monitor pain and request assistance  - Assess pain using appropriate pain scale  - Administer analgesics based on type and severity of pain and evaluate response  - Implement non-pharmacological measures as appropriate and evaluate response  - Consider cultural and social influences on pain and pain management  - Notify physician/advanced practitioner if interventions unsuccessful or patient reports new pain  Outcome: Progressing     Problem: INFECTION - ADULT  Goal: Absence or prevention of progression during hospitalization  Description: INTERVENTIONS:  - Assess and monitor for signs and symptoms of infection  - Monitor lab/diagnostic results  - Monitor all insertion sites, i.e. indwelling lines, tubes, and drains  - Monitor endotracheal if appropriate and nasal secretions for changes in amount and color  - [place] appropriate cooling/warming therapies per order  - Administer medications as ordered  - Instruct and encourage patient and family to use good hand hygiene technique  - Identify and instruct in appropriate isolation precautions for identified infection/condition  Outcome: Progressing  Goal: Absence of fever/infection during neutropenic period  Description: INTERVENTIONS:  - Monitor WBC    Outcome: Progressing     Problem: SAFETY ADULT  Goal: Patient will remain free of falls  Description: INTERVENTIONS:  - Educate patient/family on patient safety including physical limitations  - Instruct patient to call for assistance with activity   - Consult OT/PT to assist with strengthening/mobility   - Keep Call bell within reach  - Keep bed low and locked with side rails adjusted as appropriate  - Keep care items and personal belongings within reach  - Initiate and maintain comfort rounds  - Make Fall Risk Sign visible to staff  - Offer Toileting every  Hours,  in advance of need  - Initiate/Maintain alarm  - Obtain necessary fall risk management equipment:   - Apply yellow socks and bracelet for high fall risk patients  - Consider moving patient to room near nurses station  Outcome: Progressing  Goal: Maintain or return to baseline ADL function  Description: INTERVENTIONS:  -  Assess patient's ability to carry out ADLs; assess patient's baseline for ADL function and identify physical deficits which impact ability to perform ADLs (bathing, care of mouth/teeth, toileting, grooming, dressing, etc.)  - Assess/evaluate cause of self-care deficits   - Assess range of motion  - Assess patient's mobility; develop plan if impaired  - Assess patient's need for assistive devices and provide as appropriate  - Encourage maximum independence but intervene and supervise when necessary  - Involve family in performance of ADLs  - Assess for home care needs following discharge   - Consider OT consult to assist with ADL evaluation and planning for discharge  - Provide patient education as appropriate  Outcome: Progressing  Goal: Maintains/Returns to pre admission functional level  Description: INTERVENTIONS:  - Perform AM-PAC 6 Click Basic Mobility/ Daily Activity assessment daily.  - Set and communicate daily mobility goal to care team and patient/family/caregiver.   - Collaborate with rehabilitation services on mobility goals if consulted  - Perform Range of Motion  times a day.  - Reposition patient every  hours.  - Dangle patient  times a day  - Stand patient  times a day  - Ambulate patient  times a day  - Out of bed to chair  times a day   - Out of bed for meals  times a day  - Out of bed for toileting  - Record patient progress and toleration of activity level   Outcome: Progressing     Problem: Knowledge Deficit  Goal: Patient/family/caregiver demonstrates understanding of disease process, treatment plan, medications, and discharge instructions  Description: Complete learning  assessment and assess knowledge base.  Interventions:  - Provide teaching at level of understanding  - Provide teaching via preferred learning methods  Outcome: Progressing     Problem: DISCHARGE PLANNING  Goal: Discharge to home or other facility with appropriate resources  Description: INTERVENTIONS:  - Identify barriers to discharge w/patient and caregiver  - Arrange for needed discharge resources and transportation as appropriate  - Identify discharge learning needs (meds, wound care, etc.)  - Arrange for interpretive services to assist at discharge as needed  - Refer to Case Management Department for coordinating discharge planning if the patient needs post-hospital services based on physician/advanced practitioner order or complex needs related to functional status, cognitive ability, or social support system  Outcome: Progressing     Problem: POSTPARTUM  Goal: Experiences normal postpartum course  Description: INTERVENTIONS:  - Monitor maternal vital signs  - Assess uterine involution and lochia  Outcome: Progressing  Goal: Appropriate maternal -  bonding  Description: INTERVENTIONS:  - Identify family support  - Assess for appropriate maternal/infant bonding   -Encourage maternal/infant bonding opportunities  - Referral to  or  as needed  Outcome: Progressing  Goal: Establishment of infant feeding pattern  Description: INTERVENTIONS:  - Assess breast/bottle feeding  - Refer to lactation as needed  Outcome: Progressing  Goal: Incision(s), wounds(s) or drain site(s) healing without S/S of infection  Description: INTERVENTIONS  - Assess and document dressing, incision, wound bed, drain sites and surrounding tissue  - Provide patient and family education  - Perform skin care/dressing changes every   Outcome: Progressing

## 2024-02-15 NOTE — TELEPHONE ENCOUNTER
"Regarding: labor sx/ contractions  ----- Message from Viky Junior sent at 2/14/2024  7:21 PM EST -----  Pt called, \" I am 39wks pregnant and I am experiencing contractions that are 10 mins apart, lasting 30-40 seconds.\"    "

## 2024-02-15 NOTE — ASSESSMENT & PLAN NOTE
Elevated PC ratio x 2 at 0.52 and 0.32 and elevated 24-hour urine protein confirms proteinuria in pregnancy. Urine culture was negative. Recommend nephrology workup postnatally if 24-hour urine protein after pregnancy continues to be abnormal. She was on baby aspirin to lessen the risk for preeclampsia.    -In the morning of 2/15, did have an elevated BP to 146/84, but on repeat checks was in the 110s-120s/60s-70s.  Repeat Pre-E labs also negative.     - Most recent BP Blood Pressure: 114/72    - 24H SBP Systolic (24hrs), Av , Min:110 , Max:146     Plan:  -F/u outpatient with nephrology  -negative repeat Pre-E labs

## 2024-02-15 NOTE — OB LABOR/OXYTOCIN SAFETY PROGRESS
Labor Progress Note - Mariela Mtz 28 y.o. female MRN: [id number]    Unit/Bed#: -01 Encounter: [id number]       Contraction Frequency (minutes): 1-3  Contraction Intensity: Moderate/Strong  Uterine Activity Characteristics: Regular  Cervical Dilation: 9        Cervical Effacement: 100  Fetal Station: 0  Baseline Rate (FHR): 130 bpm  Fetal Heart Rate (FHT): 138 BPM  FHR Category: 2     Vital Signs:   Vitals:    02/14/24 2200   BP: 117/78   Pulse: 65   Resp:    Temp:    SpO2:        Notes/comments:     Mariela is now comfortable with epidural. On cervical exam, she is 9/100/0. FHT is intermittently category two with intermittent variable decelerations, but with moderate variability and accelerations. AROM performed for clear fluid    Edilma Gloria MD 2/14/2024 10:28 PM

## 2024-02-15 NOTE — ANESTHESIA PROCEDURE NOTES
CSE Block    Patient location during procedure: OB  Start time: 2/14/2024 9:24 PM  Reason for block: procedure for pain  Staffing  Performed by: Shun Allison MD  Authorized by: Shun Allison MD    Preanesthetic Checklist  Completed: patient identified, IV checked, site marked, risks and benefits discussed, surgical consent, monitors and equipment checked, pre-op evaluation and timeout performed  CSE  Patient position: sitting  Prep: ChloraPrep  Patient monitoring: heart rate, continuous pulse ox and frequent blood pressure checks  Approach: midline  Spinal Needle  Needle type: pencil-tip   Needle gauge: 27 G  Needle length: 9 cm  Needle insertion depth: 7 cm  Epidural Needle  Injection technique: JENNIFER saline  Needle type: Tuohy   Needle gauge: 17 G  Needle length: 9 cm  Needle insertion depth: 6 cm  Location: L3-L4  Catheter  Catheter type: end hole  Catheter size: 16 G  Catheter at skin depth: 12 cm  Catheter securement method: stabilization device  Assessment  Sensory level: T10  Injection Assessment:  negative aspiration for heme, no pain on injection and no paresthesia on injection.

## 2024-02-15 NOTE — OB LABOR/OXYTOCIN SAFETY PROGRESS
Labor Progress Note - Mariela Mtz 28 y.o. female MRN: [id number]    Unit/Bed#: -01 Encounter: [id number]       Contraction Frequency (minutes): 1.5-2  Contraction Intensity: Strong  Uterine Activity Characteristics: Regular  Cervical Dilation: 10  Dilation Complete Date: 24  Dilation Complete Time: 230  Cervical Effacement: 100  Fetal Station: 2  Baseline Rate (FHR): 130 bpm  Fetal Heart Rate (FHT): 157 BPM  FHR Category: I               Vital Signs:   Vitals:    24 2245   BP:    Pulse:    Resp: 18   Temp: 97.5 °F (36.4 °C)   SpO2:        Notes/comments:   Mariela is feeling pressure, SVE as above. Anticipate starting pushing and     Claudine Harper MD 2024 11:09 PM

## 2024-02-15 NOTE — PROGRESS NOTES
"Progress Note - OB/GYN   Mariela Mtz 28 y.o. female MRN: [id number]  Unit/Bed#: -01 Encounter: [id number]    Assessment/Plan:  28 y.o.  PPD#1 s/p  at 39w3d currently doing well, without concern.  Likely discharge tomorrow if comfortable with feeding.    *  (spontaneous vaginal delivery)  Assessment & Plan  Patient is a 28 y.o.  PPD#1 s/p  at 39w3d currently doing well, without concern this morning.  She does not have a current plan for contraception.    Plan:  -Routine postpartum care  -Encourage ambulation  -Encourage familial bonding  -Lactation support as needed  -Pain: Motrin/Tylenol around the clock  -Patient to discuss with partner plans for contraception        Proteinuria affecting pregnancy in third trimester  Assessment & Plan  Elevated PC ratio x 2 at 0.52 and 0.32 and elevated 24-hour urine protein confirms proteinuria in pregnancy. Urine culture was negative. Recommend nephrology workup postnatally if 24-hour urine protein after pregnancy continues to be abnormal. She was on baby aspirin to lessen the risk for preeclampsia.    Plan:  -F/u outpatient with nephrology  -Monitor blood pressures    History of pre-eclampsia in prior pregnancy, currently pregnant  Assessment & Plan  Patient has a history of pre-eclampsia from previous pregnancy.  She did have proteinuria in this pregnancy.    Plan:  -F/u Pre-E labs = Negative       Dispo: likely discharge tomorrow  Barriers to discharge: feeding/latching      Subjective/Objective   Chief Complaint: post-partum state    28 y.o.  PPD#1 s/p  at 39w3d    Subjective:     Pain: no  Tolerating PO: yes  Voiding: yes  Flatus: yes  BM: no  Ambulating: yes  Chest pain: no  Shortness of breath: no  Leg pain: no  Lochia: mild      Objective:     Vitals: Blood pressure 117/70, pulse 62, temperature 97.9 °F (36.6 °C), temperature source Oral, resp. rate 18, height 5' 7\" (1.702 m), weight 84.4 kg (186 lb), last menstrual period " 05/14/2023, SpO2 93%, unknown if currently breastfeeding.    Physical Exam:     Physical Exam  Constitutional:       General: She is not in acute distress.     Appearance: Normal appearance.   Cardiovascular:      Rate and Rhythm: Normal rate and regular rhythm.      Heart sounds: Normal heart sounds.   Pulmonary:      Effort: Pulmonary effort is normal.      Breath sounds: Normal breath sounds.   Abdominal:      General: Abdomen is flat. Bowel sounds are normal.      Palpations: Abdomen is soft.   Musculoskeletal:      Right lower leg: No edema.      Left lower leg: No edema.   Skin:     General: Skin is warm and dry.      Capillary Refill: Capillary refill takes less than 2 seconds.   Neurological:      Mental Status: She is alert and oriented to person, place, and time.   Psychiatric:         Mood and Affect: Mood normal.         Behavior: Behavior normal.           Lab, Imaging and other studies: I have personally reviewed pertinent reports.      Lab Results   Component Value Date    WBC 12.54 (H) 02/14/2024    HGB 13.0 02/14/2024    HCT 37.4 02/14/2024    MCV 90 02/14/2024     02/14/2024               Brian Smith DO  02/15/24

## 2024-02-15 NOTE — ANESTHESIA PREPROCEDURE EVALUATION
Procedure:  LABOR ANALGESIA    Relevant Problems   GYN   (+) 39 weeks gestation of pregnancy        Physical Exam    Airway    Mallampati score: II         Dental   No notable dental hx     Cardiovascular      Pulmonary      Other Findings  post-pubertal.      Anesthesia Plan  ASA Score- 2     Anesthesia Type- epidural with ASA Monitors.         Additional Monitors:     Airway Plan:     Comment: I, Dr. Allison, the attending physician, have personally seen and evaluated the patient prior to anesthetic care.  I have reviewed the pre-anesthetic record, and other medical records if appropriate to the anesthetic care.  If a CRNA is involved in the case, I have reviewed the CRNA assessment, if present, and agree.  The patient is in a suitable condition to proceed with my formulated anesthetic plan.  .       Plan Factors-    Chart reviewed.                      Induction- intravenous.    Postoperative Plan-     Informed Consent- Anesthetic plan and risks discussed with patient.  I personally reviewed this patient with the CRNA. Discussed and agreed on the Anesthesia Plan with the CRNA..

## 2024-02-15 NOTE — OB LABOR/OXYTOCIN SAFETY PROGRESS
Labor Progress Note - Mariela Mtz 28 y.o. female MRN: [id number]    Unit/Bed#: -01 Encounter: [id number]       Contraction Frequency (minutes): 2-3  Contraction Intensity: Moderate/Strong  Uterine Activity Characteristics: Regular  Cervical Dilation: 8        Cervical Effacement: 100  Fetal Station: 0  Baseline Rate (FHR): 135 bpm  Fetal Heart Rate (FHT): 135 BPM  FHR Category: 1      Vital Signs:   Vitals:    24   BP: 146/84   Pulse: 62   Resp: 20   Temp: 98.3 °F (36.8 °C)   SpO2: 97%       Notes/comments:     Mariela is feeling increasing pressure. On cervical exam, she is 8/100/0 and intact. She will get her epidural and then plan for AROM and .       Edilma Gloria MD 2024 9:15 PM

## 2024-02-15 NOTE — ASSESSMENT & PLAN NOTE
Patient has a history of pre-eclampsia from previous pregnancy.  She did have proteinuria in this pregnancy.  -In the morning of 2/15, did have an elevated BP to 146/84, but on repeat checks was in the 110s-120s/60s-70s.  Repeat Pre-E labs also negative.    - Most recent BP Blood Pressure: 114/72    - 24H SBP Systolic (24hrs), Av , Min:110 , Max:146        Plan:  -F/u Pre-E labs = Negative

## 2024-02-16 VITALS
HEART RATE: 59 BPM | SYSTOLIC BLOOD PRESSURE: 117 MMHG | TEMPERATURE: 98.2 F | DIASTOLIC BLOOD PRESSURE: 72 MMHG | HEIGHT: 67 IN | WEIGHT: 186 LBS | OXYGEN SATURATION: 100 % | BODY MASS INDEX: 29.19 KG/M2 | RESPIRATION RATE: 18 BRPM

## 2024-02-16 PROCEDURE — 99024 POSTOP FOLLOW-UP VISIT: CPT | Performed by: OBSTETRICS & GYNECOLOGY

## 2024-02-16 PROCEDURE — NC001 PR NO CHARGE: Performed by: OBSTETRICS & GYNECOLOGY

## 2024-02-16 RX ADMIN — WITCH HAZEL 1 PAD: 500 SOLUTION RECTAL; TOPICAL at 04:18

## 2024-02-16 RX ADMIN — IBUPROFEN 600 MG: 600 TABLET, FILM COATED ORAL at 05:19

## 2024-02-16 RX ADMIN — BENZOCAINE AND LEVOMENTHOL 1 APPLICATION: 200; 5 SPRAY TOPICAL at 04:18

## 2024-02-16 RX ADMIN — DOCUSATE SODIUM 100 MG: 100 CAPSULE, LIQUID FILLED ORAL at 08:46

## 2024-02-16 NOTE — PLAN OF CARE
Problem: PAIN - ADULT  Goal: Verbalizes/displays adequate comfort level or baseline comfort level  Description: Interventions:  - Encourage patient to monitor pain and request assistance  - Assess pain using appropriate pain scale  - Administer analgesics based on type and severity of pain and evaluate response  - Implement non-pharmacological measures as appropriate and evaluate response  - Consider cultural and social influences on pain and pain management  - Notify physician/advanced practitioner if interventions unsuccessful or patient reports new pain  2/16/2024 1143 by Gayle Gong RN  Outcome: Adequate for Discharge  2/16/2024 1021 by Gayle Gong RN  Outcome: Adequate for Discharge     Problem: INFECTION - ADULT  Goal: Absence or prevention of progression during hospitalization  Description: INTERVENTIONS:  - Assess and monitor for signs and symptoms of infection  - Monitor lab/diagnostic results  - Monitor all insertion sites, i.e. indwelling lines, tubes, and drains  - Monitor endotracheal if appropriate and nasal secretions for changes in amount and color  - [place] appropriate cooling/warming therapies per order  - Administer medications as ordered  - Instruct and encourage patient and family to use good hand hygiene technique  - Identify and instruct in appropriate isolation precautions for identified infection/condition  2/16/2024 1143 by Gayle Gong RN  Outcome: Adequate for Discharge  2/16/2024 1021 by Gayle Gong RN  Outcome: Adequate for Discharge  Goal: Absence of fever/infection during neutropenic period  Description: INTERVENTIONS:  - Monitor WBC    2/16/2024 1143 by Gayle Gong RN  Outcome: Adequate for Discharge  2/16/2024 1021 by Gayle Gong RN  Outcome: Adequate for Discharge     Problem: SAFETY ADULT  Goal: Patient will remain free of falls  Description: INTERVENTIONS:  - Educate patient/family on patient safety including physical limitations  - Instruct patient to call  for assistance with activity   - Consult OT/PT to assist with strengthening/mobility   - Keep Call bell within reach  - Keep bed low and locked with side rails adjusted as appropriate  - Keep care items and personal belongings within reach  - Initiate and maintain comfort rounds  - Make Fall Risk Sign visible to staff  - Offer Toileting every  Hours, in advance of need  - Initiate/Maintain alarm  - Obtain necessary fall risk management equipment:   - Apply yellow socks and bracelet for high fall risk patients  - Consider moving patient to room near nurses station  2/16/2024 1143 by Gayle Gong RN  Outcome: Adequate for Discharge  2/16/2024 1021 by Gayle Gong RN  Outcome: Adequate for Discharge  Goal: Maintain or return to baseline ADL function  Description: INTERVENTIONS:  -  Assess patient's ability to carry out ADLs; assess patient's baseline for ADL function and identify physical deficits which impact ability to perform ADLs (bathing, care of mouth/teeth, toileting, grooming, dressing, etc.)  - Assess/evaluate cause of self-care deficits   - Assess range of motion  - Assess patient's mobility; develop plan if impaired  - Assess patient's need for assistive devices and provide as appropriate  - Encourage maximum independence but intervene and supervise when necessary  - Involve family in performance of ADLs  - Assess for home care needs following discharge   - Consider OT consult to assist with ADL evaluation and planning for discharge  - Provide patient education as appropriate  2/16/2024 1143 by Gayle Gong RN  Outcome: Adequate for Discharge  2/16/2024 1021 by Gayle Gong RN  Outcome: Adequate for Discharge  Goal: Maintains/Returns to pre admission functional level  Description: INTERVENTIONS:  - Perform AM-PAC 6 Click Basic Mobility/ Daily Activity assessment daily.  - Set and communicate daily mobility goal to care team and patient/family/caregiver.   - Collaborate with rehabilitation services on  mobility goals if consulted  - Perform Range of Motion  times a day.  - Reposition patient every  hours.  - Dangle patient  times a day  - Stand patient  times a day  - Ambulate patient  times a day  - Out of bed to chair  times a day   - Out of bed for meals  times a day  - Out of bed for toileting  - Record patient progress and toleration of activity level   2/16/2024 1143 by Gayle Gong RN  Outcome: Adequate for Discharge  2/16/2024 1021 by Gayle Gong RN  Outcome: Adequate for Discharge     Problem: Knowledge Deficit  Goal: Patient/family/caregiver demonstrates understanding of disease process, treatment plan, medications, and discharge instructions  Description: Complete learning assessment and assess knowledge base.  Interventions:  - Provide teaching at level of understanding  - Provide teaching via preferred learning methods  2/16/2024 1143 by Gayle Gong RN  Outcome: Adequate for Discharge  2/16/2024 1021 by Gayle Gong RN  Outcome: Adequate for Discharge     Problem: DISCHARGE PLANNING  Goal: Discharge to home or other facility with appropriate resources  Description: INTERVENTIONS:  - Identify barriers to discharge w/patient and caregiver  - Arrange for needed discharge resources and transportation as appropriate  - Identify discharge learning needs (meds, wound care, etc.)  - Arrange for interpretive services to assist at discharge as needed  - Refer to Case Management Department for coordinating discharge planning if the patient needs post-hospital services based on physician/advanced practitioner order or complex needs related to functional status, cognitive ability, or social support system  2/16/2024 1143 by Gayle Gong RN  Outcome: Adequate for Discharge  2/16/2024 1021 by Gayle Gong RN  Outcome: Adequate for Discharge     Problem: POSTPARTUM  Goal: Experiences normal postpartum course  Description: INTERVENTIONS:  - Monitor maternal vital signs  - Assess uterine involution and  charo  2024 1143 by Gayle Gong RN  Outcome: Adequate for Discharge  2024 1021 by Gayle Gong RN  Outcome: Adequate for Discharge  Goal: Appropriate maternal -  bonding  Description: INTERVENTIONS:  - Identify family support  - Assess for appropriate maternal/infant bonding   -Encourage maternal/infant bonding opportunities  - Referral to  or  as needed  2024 1143 by Gayle Gong RN  Outcome: Adequate for Discharge  2024 1021 by Gayle Gong RN  Outcome: Adequate for Discharge  Goal: Establishment of infant feeding pattern  Description: INTERVENTIONS:  - Assess breast/bottle feeding  - Refer to lactation as needed  2024 1143 by Gayle Gong RN  Outcome: Adequate for Discharge  2024 1021 by Gayle Gong RN  Outcome: Adequate for Discharge  Goal: Incision(s), wounds(s) or drain site(s) healing without S/S of infection  Description: INTERVENTIONS  - Assess and document dressing, incision, wound bed, drain sites and surrounding tissue  - Provide patient and family education  - Perform skin care/dressing changes every   2024 1143 by Gayle Gong RN  Outcome: Adequate for Discharge  2024 1021 by Gayle Gong RN  Outcome: Adequate for Discharge

## 2024-02-16 NOTE — PROGRESS NOTES
Progress Note - OB/GYN   Mariela Mtz 28 y.o. female MRN: [id number]  Unit/Bed#: -01 Encounter: [id number]    Assessment/Plan:  28 y.o.  PPD#2 s/p uncomplicated , doing well this morning without complaint.  Likely discharge later today.    *  (spontaneous vaginal delivery)  Assessment & Plan  Patient is a 28 y.o.  PPD#1 s/p  at 39w3d currently doing well, without concern this morning.  She does not have a current plan for contraception.    Plan:  -Routine postpartum care  -Encourage ambulation  -Encourage familial bonding  -Lactation support as needed  -Pain: Motrin/Tylenol around the clock  -Contraception plan: barrier protection; will discuss further options  -Likely discharge later this afternoon        Proteinuria affecting pregnancy in third trimester  Assessment & Plan  Elevated PC ratio x 2 at 0.52 and 0.32 and elevated 24-hour urine protein confirms proteinuria in pregnancy. Urine culture was negative. Recommend nephrology workup postnatally if 24-hour urine protein after pregnancy continues to be abnormal. She was on baby aspirin to lessen the risk for preeclampsia.    -In the morning of 2/15, did have an elevated BP to 146/84, but on repeat checks was in the 110s-120s/60s-70s.  Repeat Pre-E labs also negative.     - Most recent BP Blood Pressure: 114/72    - 24H SBP Systolic (24hrs), Av , Min:110 , Max:146     Plan:  -F/u outpatient with nephrology  -negative repeat Pre-E labs    History of pre-eclampsia in prior pregnancy, currently pregnant  Assessment & Plan  Patient has a history of pre-eclampsia from previous pregnancy.  She did have proteinuria in this pregnancy.  -In the morning of 2/15, did have an elevated BP to 146/84, but on repeat checks was in the 110s-120s/60s-70s.  Repeat Pre-E labs also negative.    - Most recent BP Blood Pressure: 114/72    - 24H SBP Systolic (24hrs), Av , Min:110 , Max:146        Plan:  -F/u Pre-E labs = Negative        "    Subjective/Objective   Chief Complaint: Post-partum state    28 y.o.  PPD#2 s/p uncomplicated     Subjective:     Pain: no  Tolerating PO: yes  Voiding: yes  Flatus: yes  BM: yes  Ambulating: yes  Chest pain: no  Shortness of breath: no  Leg pain: no  Lochia: normal        Objective:     Vitals: Blood pressure 114/72, pulse 60, temperature 98.3 °F (36.8 °C), temperature source Oral, resp. rate 20, height 5' 7\" (1.702 m), weight 84.4 kg (186 lb), last menstrual period 2023, SpO2 98%, currently breastfeeding.    Physical Exam:     Physical Exam  Constitutional:       General: She is not in acute distress.     Appearance: Normal appearance.   Cardiovascular:      Rate and Rhythm: Normal rate and regular rhythm.      Heart sounds: Normal heart sounds.   Pulmonary:      Effort: Pulmonary effort is normal.      Breath sounds: Normal breath sounds.   Abdominal:      General: Abdomen is flat. Bowel sounds are normal.      Palpations: Abdomen is soft.   Musculoskeletal:         General: No tenderness.      Right lower leg: No edema.      Left lower leg: No edema.   Skin:     General: Skin is warm and dry.      Capillary Refill: Capillary refill takes less than 2 seconds.   Neurological:      Mental Status: She is alert.   Psychiatric:         Mood and Affect: Mood normal.         Behavior: Behavior normal.           Lab, Imaging and other studies: I have personally reviewed pertinent reports.      Lab Results   Component Value Date    WBC 12.77 (H) 02/15/2024    HGB 12.2 02/15/2024    HCT 35.6 02/15/2024    MCV 92 02/15/2024     02/15/2024               Brian Smith DO  24   "

## 2024-02-16 NOTE — PLAN OF CARE
Problem: PAIN - ADULT  Goal: Verbalizes/displays adequate comfort level or baseline comfort level  Description: Interventions:  - Encourage patient to monitor pain and request assistance  - Assess pain using appropriate pain scale  - Administer analgesics based on type and severity of pain and evaluate response  - Implement non-pharmacological measures as appropriate and evaluate response  - Consider cultural and social influences on pain and pain management  - Notify physician/advanced practitioner if interventions unsuccessful or patient reports new pain  Outcome: Adequate for Discharge     Problem: INFECTION - ADULT  Goal: Absence or prevention of progression during hospitalization  Description: INTERVENTIONS:  - Assess and monitor for signs and symptoms of infection  - Monitor lab/diagnostic results  - Monitor all insertion sites, i.e. indwelling lines, tubes, and drains  - Monitor endotracheal if appropriate and nasal secretions for changes in amount and color  - [place] appropriate cooling/warming therapies per order  - Administer medications as ordered  - Instruct and encourage patient and family to use good hand hygiene technique  - Identify and instruct in appropriate isolation precautions for identified infection/condition  Outcome: Adequate for Discharge  Goal: Absence of fever/infection during neutropenic period  Description: INTERVENTIONS:  - Monitor WBC    Outcome: Adequate for Discharge     Problem: SAFETY ADULT  Goal: Patient will remain free of falls  Description: INTERVENTIONS:  - Educate patient/family on patient safety including physical limitations  - Instruct patient to call for assistance with activity   - Consult OT/PT to assist with strengthening/mobility   - Keep Call bell within reach  - Keep bed low and locked with side rails adjusted as appropriate  - Keep care items and personal belongings within reach  - Initiate and maintain comfort rounds  - Make Fall Risk Sign visible to  staff  - Offer Toileting every  Hours, in advance of need  - Initiate/Maintain alarm  - Obtain necessary fall risk management equipment:   - Apply yellow socks and bracelet for high fall risk patients  - Consider moving patient to room near nurses station  Outcome: Adequate for Discharge  Goal: Maintain or return to baseline ADL function  Description: INTERVENTIONS:  -  Assess patient's ability to carry out ADLs; assess patient's baseline for ADL function and identify physical deficits which impact ability to perform ADLs (bathing, care of mouth/teeth, toileting, grooming, dressing, etc.)  - Assess/evaluate cause of self-care deficits   - Assess range of motion  - Assess patient's mobility; develop plan if impaired  - Assess patient's need for assistive devices and provide as appropriate  - Encourage maximum independence but intervene and supervise when necessary  - Involve family in performance of ADLs  - Assess for home care needs following discharge   - Consider OT consult to assist with ADL evaluation and planning for discharge  - Provide patient education as appropriate  Outcome: Adequate for Discharge  Goal: Maintains/Returns to pre admission functional level  Description: INTERVENTIONS:  - Perform AM-PAC 6 Click Basic Mobility/ Daily Activity assessment daily.  - Set and communicate daily mobility goal to care team and patient/family/caregiver.   - Collaborate with rehabilitation services on mobility goals if consulted  - Perform Range of Motion  times a day.  - Reposition patient every  hours.  - Dangle patient  times a day  - Stand patient  times a day  - Ambulate patient  times a day  - Out of bed to chair  times a day   - Out of bed for meals  times a day  - Out of bed for toileting  - Record patient progress and toleration of activity level   Outcome: Adequate for Discharge     Problem: Knowledge Deficit  Goal: Patient/family/caregiver demonstrates understanding of disease process, treatment plan,  medications, and discharge instructions  Description: Complete learning assessment and assess knowledge base.  Interventions:  - Provide teaching at level of understanding  - Provide teaching via preferred learning methods  Outcome: Adequate for Discharge     Problem: DISCHARGE PLANNING  Goal: Discharge to home or other facility with appropriate resources  Description: INTERVENTIONS:  - Identify barriers to discharge w/patient and caregiver  - Arrange for needed discharge resources and transportation as appropriate  - Identify discharge learning needs (meds, wound care, etc.)  - Arrange for interpretive services to assist at discharge as needed  - Refer to Case Management Department for coordinating discharge planning if the patient needs post-hospital services based on physician/advanced practitioner order or complex needs related to functional status, cognitive ability, or social support system  Outcome: Adequate for Discharge     Problem: POSTPARTUM  Goal: Experiences normal postpartum course  Description: INTERVENTIONS:  - Monitor maternal vital signs  - Assess uterine involution and lochia  Outcome: Adequate for Discharge  Goal: Appropriate maternal -  bonding  Description: INTERVENTIONS:  - Identify family support  - Assess for appropriate maternal/infant bonding   -Encourage maternal/infant bonding opportunities  - Referral to  or  as needed  Outcome: Adequate for Discharge  Goal: Establishment of infant feeding pattern  Description: INTERVENTIONS:  - Assess breast/bottle feeding  - Refer to lactation as needed  Outcome: Adequate for Discharge  Goal: Incision(s), wounds(s) or drain site(s) healing without S/S of infection  Description: INTERVENTIONS  - Assess and document dressing, incision, wound bed, drain sites and surrounding tissue  - Provide patient and family education  - Perform skin care/dressing changes every   Outcome: Adequate for Discharge

## 2024-02-16 NOTE — DISCHARGE SUMMARY
Discharge Summary - Mariela Mtz 28 y.o. female MRN: [id number]    Unit/Bed#: -01 Encounter: [id number]    Admission Date: 2024     Discharge Date: 2024    Patient Active Problem List   Diagnosis    Proteinuria affecting pregnancy in third trimester    History of pre-eclampsia in prior pregnancy, currently pregnant    39 weeks gestation of pregnancy    History of pre-eclampsia in prior pregnancy, currently pregnant, third trimester     (spontaneous vaginal delivery)       OBGYN Practice: [facility] Course:   Mariela Mtz is a 28 y.o. [id number] 39w3d . She presented to labor and delivery for painful contractions. Her pregnancy was complicated by proteinuria and she had a history of preeclampsia in her prior pregnancy. On exam in triage she was noted to be 6/80/0. She was admitted for labor and received an epidural for pain control. She progressed to 9/90/+1 and amniotomy was performed for clear fluid. She progressed to complete and started pushing.  She did have one elevated blood pressure post-partum to 146/84, but had normotensive pressures preceding and after.  She had a negative pre-eclampsia workup and was asymptomatic.      Delivery Findings:  Mariela delivered a viable male  on 2024  11:22 PM  via Vaginal, Spontaneous  . The delivery was uncomplicated.    Baby's Weight: 3152 g (6 lb 15.2 oz) ; 111.2     Apgar scores: 9  and 9  at 1 and 5 minutes, respectively  Anesthesia: Epidural ,   QBL: Non-Surgical QBL (mL): 29         was transferred to  nursery. Patient tolerated the procedure well and was transferred to recovery in stable condition.     Her post-partum course was uncomplicated.  Her post-partum pain was well controlled with oral analgesics. On day of discharge she was ambulating, voiding spontaneously, tolerating oral intake and hemodynamically stable. Mom's blood type is B positive and  Rhogam was not given.    She was discharged  home on postpartum day #2 without complications. Patient was instructed to follow up with her OB as an outpatient and was given appropriate warnings to call doctor or provider if she develops signs of infection or uncontrolled pain.    Discharge Problem List by Issue:   *  (spontaneous vaginal delivery)  Assessment & Plan  Patient is a 28 y.o.  PPD#1 s/p  at 39w3d currently doing well, without concern this morning.  She does not have a current plan for contraception.    Plan:  -Routine postpartum care  -Encourage ambulation  -Encourage familial bonding  -Lactation support as needed  -Pain: Motrin/Tylenol around the clock  -Contraception plan: barrier protection; will discuss further options  -Likely discharge later this afternoon        Proteinuria affecting pregnancy in third trimester  Assessment & Plan  Elevated PC ratio x 2 at 0.52 and 0.32 and elevated 24-hour urine protein confirms proteinuria in pregnancy. Urine culture was negative. Recommend nephrology workup postnatally if 24-hour urine protein after pregnancy continues to be abnormal. She was on baby aspirin to lessen the risk for preeclampsia.    -In the morning of 2/15, did have an elevated BP to 146/84, but on repeat checks was in the 110s-120s/60s-70s.  Repeat Pre-E labs also negative.     - Most recent BP Blood Pressure: 114/72    - 24H SBP Systolic (24hrs), Av , Min:110 , Max:146     Plan:  -F/u outpatient with nephrology  -negative repeat Pre-E labs    History of pre-eclampsia in prior pregnancy, currently pregnant  Assessment & Plan  Patient has a history of pre-eclampsia from previous pregnancy.  She did have proteinuria in this pregnancy.  -In the morning of 2/15, did have an elevated BP to 146/84, but on repeat checks was in the 110s-120s/60s-70s.  Repeat Pre-E labs also negative.    - Most recent BP Blood Pressure: 114/72    - 24H SBP Systolic (24hrs), Av , Min:110 , Max:146        Plan:  -F/u Pre-E labs = Negative            Disposition: Home    Planned Readmission: No    Discharge Medications:   Please see AVS    Discharge instructions :   -Do not place anything (no partner, tampons or douche) in your vagina for 6 weeks.  -You may walk for exercise for the first 6 weeks then gradually return to your usual activities.   -Please do not drive for 1 week if you have no stitches and for 2 weeks if you have stitches or underwent a  delivery.    -You may take baths or shower per your preference.   -Please look at your bust (breasts) in the mirror daily and call your doctor for redness or tenderness or increased warmth.   - If you have had a  section please look at your incision daily as well and call provider for increasing redness or steady drainage from the incision.   -Please call your doctor's office if temperature > 100.4*F or 38* C, worsening pain or a foul discharge.    Follow Up:  - Follow up in 3 weeks for postpartum visit

## 2024-02-16 NOTE — LACTATION NOTE
This note was copied from a baby's chart.  CONSULT - LACTATION  Baby Boy Mtz (Kelly) 1 days male MRN: [id number]    [facility] AN NURSERY Room / Bed: (N)/(N) Encounter: [id number]    Maternal Information     MOTHER:  Mariela Mtz  Maternal Age: 28 y.o.   OB History: # 1 - Date: 22, Sex: Male, Weight: 2977 g (6 lb 9 oz), GA: 39w0d, Delivery: Vaginal, Spontaneous, Apgar1: None, Apgar5: None, Living: Living, Birth Comments: None    # 2 - Date: 24, Sex: Male, Weight: 3152 g (6 lb 15.2 oz), GA: 39w3d, Delivery: Vaginal, Spontaneous, Apgar1: 9, Apgar5: 9, Living: Living, Birth Comments: None   Previouse breast reduction surgery? No    Lactation history:   Has patient previously breast fed: Yes   How long had patient previously breast fed:  (Over 1 year)   Previous breast feeding complications: Breast/nipple pain (Mother used nipple shield for 3 months, first child has laser to correct tongue and lip tie at 3 months and was able to D/C use of shield.)     Past Surgical History:   Procedure Laterality Date    WISDOM TOOTH EXTRACTION          Birth information:  YOB: 2026   Time of birth: 11:22 PM   Sex: male   Delivery type: Vaginal, Spontaneous   Birth Weight: 3152 g (6 lb 15.2 oz)   Percent of Weight Change: 0%     Gestational Age: 39w3d   [unfilled]    Assessment     Breast and nipple assessment: normal assessment    [place] Assessment: receded chin, no digital oral exam performed    Feeding assessment: feeding well  LATCH:  Latch: Grasps breast, tongue down, lips flanged, rhythmic sucking   Audible Swallowing: A few with stimulation   Type of Nipple: Everted (After stimulation)   Comfort (Breast/Nipple): Engorged, cracked, bleeding, large blisters or bruises   Hold (Positioning): Partial assist, teach one side, mother does other, staff holds   LATCH Score: 6        Having latch problems? No   Position(s) Used Cross Cradle   Breasts/Nipples    Left Breast Soft   Right Breast Soft   Left Nipple Cracked  (Crack down center of nipple)   Right Nipple Blisters  (At 12 o'clock on tip of nipple)   Intervention Lanolin   Breastfeeding Progress Not yet established   Other OB Lactation Tools   Feeding Devices Lanolin   Breast Pump   Pump 3  (Medela and ordered raj)   Patient Follow-Up   Lactation Consult Status 2   Follow-Up Type Inpatient;Call as needed   Other OB Lactation Documentation    Additional Problem Noted Pt has B/L nipple damage. Baby has recessed chin and older sibling had tongue tie. Mom dragging baby up onto nipple. Enc. to plant chin to allow for deep latch, manually flange bottom lip. Message sent to Baby and me for appt next week.     Feeding recommendations:  breast feed on demand  Mother reports being experienced with breastfeeding. She reports feeding her oldest child for longer than 1 year. Mother reports she used a nipple shield until older baby was 3 months old. Baby had laser to fix tongue/lip tie and mother reports she was aple to d/c use of shield and reports she no longer had pain.     RSB & DC booklets reviewed.     Mother reports latches have been painful. Upon visual assessment of breasts, mother has blister on left breast at 12 o'clock and cracks down the center of the nipple on the right breast. Lanolin and non-adherent dressing provided for wet wound care.     Baby brought to left breast in cross-cradle hold. Mother demonstrating snug hold of baby but aligning nipple to mouth. Encouraged to align nipple to nose and plant baby's chin. Mother bringing baby up over top of nipple and chin dragging. Latch obtained, mother reports latch is pinchy. Baby un-latched with pinky finger technique. Educated mother to plant baby's chin and apply pressure through shoulder to obtain deeper latch. Mother brought baby up over nipple again, shallow latch obtained. Demonstrated with adequate teach-back how to apply gentle pressure on baby's chin  to manually flange bottom lip. Baby has deeper latch and mom reports this feels better.     Message sent to Baby & me to schedule appt.   Encouraged to call for additional support or questions.     Information on hand expression given. Discussed benefits of knowing how to manually express breast including stimulating milk supply, softening nipple for latch and evacuating breast in the event of engorgement.    Mom is encouraged to place baby skin to skin for feedings. Skin to skin education provided for baby placement on mother's chest, baby only in diaper, blankets below shoulders on baby's back. Skin to skin is encouraged to continue at home for feedings and between feedings.      - Start feedings on breast that last feeding ended   - allow no more than 3 hours between breast feeding sessions   - time between feedings is counted from the beginning of the first feed to the beginning of the next feeding session    Reviewed early signs of hunger, including tensing of hands and shoulders - no need to wait for open eyes.  Crying is a late hunger sign.  If baby is crying, soothe baby first and then attempt to latch.  Reviewed normal sucking patterns: transition from stimulation to nutritive to release or non-nutritive. The goal is to see and hear lots of swallowing.    Reviewed normal nursing pattern: infant could latch on one breast up to 30 minutes or until releases on own. Signs of satiation is open hand with fingers that do not grab your finger.  Discussed difference in sensation of non-nutritive v nutritive sucking    Met with mother. Provided mother with Ready, Set, Baby booklet.    Discussed Skin to Skin contact an benefits to mom and baby.  Talked about the delay of the first bath until baby has adjusted. Spoke about the benefits of rooming in. Feeding on cue and what that means for recognizing infant's hunger. Avoidance of pacifiers for the first month discussed. Talked about exclusive breastfeeding for the  first 6 months.    Positioning and latch reviewed as well as showing images of other feeding positions.  Discussed the properties of a good latch in any position. Reviewed hand/manual expression.  Discussed s/s that baby is getting enough milk and some s/s that breastfeeding dyad may need further help.    Gave information on common concerns, what to expect the first few weeks after delivery, preparing for other caregivers, and how partners can help. Resources for support also provided.    Encouraged parents to call for assistance, questions, and concerns about breastfeeding.  Extension provided.      Marlin Fu RN 2/15/2024 10:27 PM

## 2024-02-16 NOTE — LACTATION NOTE
This note was copied from a baby's chart.  CONSULT - LACTATION  Baby Boy Mtz (Kelly) 2 days male MRN: [id number]    [facility] Room / Bed: (N)/(N) Encounter: [id number]    Maternal Information     MOTHER:  Mariela Mtz  Maternal Age: 28 y.o.   OB History: # 1 - Date: 22, Sex: Male, Weight: 2977 g (6 lb 9 oz), GA: 39w0d, Delivery: Vaginal, Spontaneous, Apgar1: None, Apgar5: None, Living: Living, Birth Comments: None    # 2 - Date: 24, Sex: Male, Weight: 3152 g (6 lb 15.2 oz), GA: 39w3d, Delivery: Vaginal, Spontaneous, Apgar1: 9, Apgar5: 9, Living: Living, Birth Comments: None   Previouse breast reduction surgery? No    Lactation history:   Has patient previously breast fed: Yes   How long had patient previously breast fed:  (Over 1 year)   Previous breast feeding complications: Breast/nipple pain (Mother used nipple shield for 3 months, first child has laser to correct tongue and lip tie at 3 months and was able to D/C use of shield.)     Past Surgical History:   Procedure Laterality Date    WISDOM TOOTH EXTRACTION          Birth information:  YOB: 2026   Time of birth: 11:22 PM   Sex: male   Delivery type: Vaginal, Spontaneous   Birth Weight: 3152 g (6 lb 15.2 oz)   Percent of Weight Change: -4%     Gestational Age: 39w3d   [unfilled]    Assessment     Breast and nipple assessment:  Right nipple invert, everts with stimulation. Blister from 12 oclock to 6 oclock. Nipple crack on face of nipple . Left nipple blister at 2 oclock, crack on nipple face; moist wound healing to help     [place] Assessment: normal assessment    Feeding assessment: no latch  LATCH:  Latch: Too sleepy or reluctant, no latch achieved   Audible Swallowing: None   Type of Nipple: Everted (After stimulation)   Comfort (Breast/Nipple): Engorged, cracked, bleeding, large blisters or bruises   Hold (Positioning): Partial assist, teach one side, mother does  other, staff holds   LATCH Score: 3           02/16/24 1100   Lactation Consultation   Reason for Consult 20 minutes;20 min;5 mins   LATCH Documentation   Latch 0   Audible Swallowing 0   Type of Nipple 2   Comfort (Breast/Nipple) 0   Hold (Positioning) 1   LATCH Score 3   Having latch problems? Yes   Position(s) Used Football   Breasts/Nipples   Left Breast Soft   Right Breast Soft   Left Nipple Blisters;Cracked;Sore;Everted  (crack on nipple face, blister at 2 oclock)   Right Nipple Blisters;Cracked;Sore;Everted  (blister from 12 to 6 oclock, crack on nipple face)   Intervention Lanolin;Hand expression;Breast pump   Breastfeeding Progress Not yet established   Other OB Lactation Tools   Feeding Devices Lanolin;Pump   Breast Pump   Pump 3   Patient Follow-Up   Lactation Consult Status 2   Follow-Up Type Inpatient;Call as needed   Other OB Lactation Documentation    Additional Problem Noted Mom has b/l nipple damage. Moist wound healing. Hand pump to help roverto nipple. Attempted to nurse baby on right breast in football hold. Mom bringing breast to baby. Baby very sleepy; HE into mouth. Brought S2S and encouraged to try nursing when cueing.       Feeding recommendations:  breast feed on demand  Mom has b/l nipple damage. Moist wound healing. Hand pump given with demonstration on how to help roverto nipple. Attempted to nurse baby on right breast in football hold. Mom bringing breast to baby. Repositioned mom to bring baby to breast, encouraged snug hold. Baby very sleepy; HE into mouth. Brought S2S and encouraged to try nursing when cueing. Moist wound healing on nipples for blisters and cracks.     C/O sore nipples.  Information given about sore nipples and how to correct with positioning techniques. Discussed maneuvers to latch infant on properly to avoid nipple pain and promote healing.  Discussed treatments that could be utilized to promote healing.    Provided demonstration, education and support of deep latch to  breast by placing the nipple to the nose, dragging down to chin to achieve a wide latch. Bring baby to the breast, not breast to baby. Move your shoulders down and away from your ears. Look for ear, shoulder, hip alignment. Baby's upper and lower lip should be flanged on the breast.    Encouraged parents to call for assistance, questions, and concerns about breastfeeding.  Extension provided.    Ayesha Arciniega 2/16/2024 11:17 AM

## 2024-02-16 NOTE — PLAN OF CARE
Problem: PAIN - ADULT  Goal: Verbalizes/displays adequate comfort level or baseline comfort level  Description: Interventions:  - Encourage patient to monitor pain and request assistance  - Assess pain using appropriate pain scale  - Administer analgesics based on type and severity of pain and evaluate response  - Implement non-pharmacological measures as appropriate and evaluate response  - Consider cultural and social influences on pain and pain management  - Notify physician/advanced practitioner if interventions unsuccessful or patient reports new pain  Outcome: Progressing     Problem: INFECTION - ADULT  Goal: Absence or prevention of progression during hospitalization  Description: INTERVENTIONS:  - Assess and monitor for signs and symptoms of infection  - Monitor lab/diagnostic results  - Monitor all insertion sites, i.e. indwelling lines, tubes, and drains  - Monitor endotracheal if appropriate and nasal secretions for changes in amount and color  - [place] appropriate cooling/warming therapies per order  - Administer medications as ordered  - Instruct and encourage patient and family to use good hand hygiene technique  - Identify and instruct in appropriate isolation precautions for identified infection/condition  Outcome: Progressing  Goal: Absence of fever/infection during neutropenic period  Description: INTERVENTIONS:  - Monitor WBC    Outcome: Progressing     Problem: SAFETY ADULT  Goal: Patient will remain free of falls  Description: INTERVENTIONS:  - Educate patient/family on patient safety including physical limitations  - Instruct patient to call for assistance with activity   - Consult OT/PT to assist with strengthening/mobility   - Keep Call bell within reach  - Keep bed low and locked with side rails adjusted as appropriate  - Keep care items and personal belongings within reach  - Initiate and maintain comfort rounds  - Make Fall Risk Sign visible to staff  - Apply yellow socks and bracelet  for high fall risk patients  - Consider moving patient to room near nurses station  Outcome: Progressing  Goal: Maintain or return to baseline ADL function  Description: INTERVENTIONS:  -  Assess patient's ability to carry out ADLs; assess patient's baseline for ADL function and identify physical deficits which impact ability to perform ADLs (bathing, care of mouth/teeth, toileting, grooming, dressing, etc.)  - Assess/evaluate cause of self-care deficits   - Assess range of motion  - Assess patient's mobility; develop plan if impaired  - Assess patient's need for assistive devices and provide as appropriate  - Encourage maximum independence but intervene and supervise when necessary  - Involve family in performance of ADLs  - Assess for home care needs following discharge   - Consider OT consult to assist with ADL evaluation and planning for discharge  - Provide patient education as appropriate  Outcome: Progressing  Goal: Maintains/Returns to pre admission functional level  Description: INTERVENTIONS:  - Perform AM-PAC 6 Click Basic Mobility/ Daily Activity assessment daily.  - Set and communicate daily mobility goal to care team and patient/family/caregiver.   - Collaborate with rehabilitation services on mobility goals if consulted  - Out of bed for toileting  - Record patient progress and toleration of activity level   Outcome: Progressing     Problem: Knowledge Deficit  Goal: Patient/family/caregiver demonstrates understanding of disease process, treatment plan, medications, and discharge instructions  Description: Complete learning assessment and assess knowledge base.  Interventions:  - Provide teaching at level of understanding  - Provide teaching via preferred learning methods  Outcome: Progressing     Problem: DISCHARGE PLANNING  Goal: Discharge to home or other facility with appropriate resources  Description: INTERVENTIONS:  - Identify barriers to discharge w/patient and caregiver  - Arrange for needed  discharge resources and transportation as appropriate  - Identify discharge learning needs (meds, wound care, etc.)  - Arrange for interpretive services to assist at discharge as needed  - Refer to Case Management Department for coordinating discharge planning if the patient needs post-hospital services based on physician/advanced practitioner order or complex needs related to functional status, cognitive ability, or social support system  Outcome: Progressing     Problem: POSTPARTUM  Goal: Experiences normal postpartum course  Description: INTERVENTIONS:  - Monitor maternal vital signs  - Assess uterine involution and lochia  Outcome: Progressing  Goal: Appropriate maternal -  bonding  Description: INTERVENTIONS:  - Identify family support  - Assess for appropriate maternal/infant bonding   -Encourage maternal/infant bonding opportunities  - Referral to  or  as needed  Outcome: Progressing  Goal: Establishment of infant feeding pattern  Description: INTERVENTIONS:  - Assess breast/bottle feeding  - Refer to lactation as needed  Outcome: Progressing  Goal: Incision(s), wounds(s) or drain site(s) healing without S/S of infection  Description: INTERVENTIONS  - Assess and document dressing, incision, wound bed, drain sites and surrounding tissue  - Provide patient and family education  Outcome: Progressing

## 2024-02-19 NOTE — UTILIZATION REVIEW
"    MOTHER AND BABY DISCHARGED     NOTIFICATION OF INPATIENT ADMISSION   MATERNITY/DELIVERY AUTHORIZATION REQUEST   SERVICING FACILITY:   [facility] - L&D, [place], NICU  [address] [place] [address]. [place] [address]  Tax ID: [id number]  NPI: [id number]   ATTENDING PROVIDER:  Attending Name and NPI#: Evelyn Valenzuela Md [[id number]]  Address: [address] [place] [address]  Phone: [phone]   ADMISSION INFORMATION:  Place of Service: Inpatient [facility]  Place of Service Code: 21  Inpatient Admission Date/Time: 24  8:29 PM  Discharge Date/Time: 2024  1:00 PM  Admitting Diagnosis Code/Description:  Uterine contractions during pregnancy [O47.9]  39 weeks gestation of pregnancy [Z3A.39]  Encounter for full-term uncomplicated delivery [O80]     Mother: Mariela Mtz 1995 Estimated Date of Delivery: 24  Delivering clinician: Evelyn Valenzuela    OB History          2    Para   2    Term   2       0    AB        Living   2         SAB        IAB        Ectopic        Multiple   0    Live Births   2                Name & MRN:   Information for the patient's :  Cayetano Mtz [[id number]]    Delivery Information:  Sex: male  Delivered 2024 11:22 PM by Vaginal, Spontaneous; Gestational Age: 39w3d    [place] Measurements:  Weight: 6 lb 15.2 oz (3152 g);  Height: 20.5\"    APGAR 1 minute 5 minutes 10 minutes   Totals: 9 9       UTILIZATION REVIEW CONTACT:  Uyen Krueger Utilization   Network Utilization Review Department  Phone: [phone]  Fax [phone]  Email: [email or web address]  Contact for approvals/pending authorizations, clinical reviews, and discharge.     PHYSICIAN ADVISORY SERVICES:  Medical Necessity Denial & Liew-in-Uwbj Review  Phone: [phone]  Fax: [phone]  Email: [email or web address]     DISCHARGE SUPPORT " TEAM:  For Patients Discharge Needs & Updates  Phone: [phone] opt. 2 Fax: [phone]  Email: [email or web address]      Discharge Summary - Mariela Mtz 28 y.o. female MRN: [id number]     Unit/Bed#: -01 Encounter: [id number]     Admission Date: 2024      Discharge Date: 2024         Patient Active Problem List   Diagnosis    Proteinuria affecting pregnancy in third trimester    History of pre-eclampsia in prior pregnancy, currently pregnant    39 weeks gestation of pregnancy    History of pre-eclampsia in prior pregnancy, currently pregnant, third trimester     (spontaneous vaginal delivery)         OBGYN Practice: [facility] Course:   Mariela Mtz is a 28 y.o. [id number] 39w3d . She presented to labor and delivery for painful contractions. Her pregnancy was complicated by proteinuria and she had a history of preeclampsia in her prior pregnancy. On exam in triage she was noted to be 6/80/0. She was admitted for labor and received an epidural for pain control. She progressed to 9/90/+1 and amniotomy was performed for clear fluid. She progressed to complete and started pushing.  She did have one elevated blood pressure post-partum to 146/84, but had normotensive pressures preceding and after.  She had a negative pre-eclampsia workup and was asymptomatic.        Delivery Findings:  Mariela delivered a viable male  on 2024  11:22 PM  via Vaginal, Spontaneous  . The delivery was uncomplicated.     Baby's Weight: 3152 g (6 lb 15.2 oz) ; 111.2     Apgar scores: 9  and 9  at 1 and 5 minutes, respectively  Anesthesia: Epidural ,   QBL: Non-Surgical QBL (mL): 29          was transferred to  nursery. Patient tolerated the procedure well and was transferred to recovery in stable condition.      Her post-partum course was uncomplicated.  Her post-partum pain was well controlled with oral analgesics. On day of discharge she was ambulating, voiding  spontaneously, tolerating oral intake and hemodynamically stable. Mom's blood type is B positive and  Rhogam was not given.     She was discharged home on postpartum day #2 without complications. Patient was instructed to follow up with her OB as an outpatient and was given appropriate warnings to call doctor or provider if she develops signs of infection or uncontrolled pain.     Discharge Problem List by Issue:   *  (spontaneous vaginal delivery)  Assessment & Plan  Patient is a 28 y.o.  PPD#1 s/p  at 39w3d currently doing well, without concern this morning.  She does not have a current plan for contraception.     Plan:  -Routine postpartum care  -Encourage ambulation  -Encourage familial bonding  -Lactation support as needed  -Pain: Motrin/Tylenol around the clock  -Contraception plan: barrier protection; will discuss further options  -Likely discharge later this afternoon           Proteinuria affecting pregnancy in third trimester  Assessment & Plan  Elevated PC ratio x 2 at 0.52 and 0.32 and elevated 24-hour urine protein confirms proteinuria in pregnancy. Urine culture was negative. Recommend nephrology workup postnatally if 24-hour urine protein after pregnancy continues to be abnormal. She was on baby aspirin to lessen the risk for preeclampsia.     -In the morning of 2/15, did have an elevated BP to 146/84, but on repeat checks was in the 110s-120s/60s-70s.  Repeat Pre-E labs also negative.     - Most recent BP Blood Pressure: 114/72    - 24H SBP Systolic (24hrs), Av , Min:110 , Max:146      Plan:  -F/u outpatient with nephrology  -negative repeat Pre-E labs     History of pre-eclampsia in prior pregnancy, currently pregnant  Assessment & Plan  Patient has a history of pre-eclampsia from previous pregnancy.  She did have proteinuria in this pregnancy.  -In the morning of 2/15, did have an elevated BP to 146/84, but on repeat checks was in the 110s-120s/60s-70s.  Repeat Pre-E labs also  negative.     - Most recent BP Blood Pressure: 114/72    - 24H SBP Systolic (24hrs), Av , Min:110 , Max:146         Plan:  -F/u Pre-E labs = Negative              Disposition: Home     Planned Readmission: No     Discharge Medications:   Please see AVS     Discharge instructions :   -Do not place anything (no partner, tampons or douche) in your vagina for 6 weeks.  -You may walk for exercise for the first 6 weeks then gradually return to your usual activities.   -Please do not drive for 1 week if you have no stitches and for 2 weeks if you have stitches or underwent a  delivery.    -You may take baths or shower per your preference.   -Please look at your bust (breasts) in the mirror daily and call your doctor for redness or tenderness or increased warmth.   - If you have had a  section please look at your incision daily as well and call provider for increasing redness or steady drainage from the incision.   -Please call your doctor's office if temperature > 100.4*F or 38* C, worsening pain or a foul discharge.     Follow Up:  - Follow up in 3 weeks for postpartum visit               Cosigned by: Lilli Lopez MD at 2024 10:58 AM     Revision History

## 2024-02-21 ENCOUNTER — OFFICE VISIT (OUTPATIENT)
Dept: POSTPARTUM | Facility: CLINIC | Age: 29
End: 2024-02-21
Payer: COMMERCIAL

## 2024-02-21 PROCEDURE — 99404 PREV MED CNSL INDIV APPRX 60: CPT | Performed by: PEDIATRICS

## 2024-02-21 NOTE — PROGRESS NOTES
"INITIAL BREAST FEEDING EVALUATION    Informant/Relationship: Mariela (mom/self), Gail (paternal grandmother)    Discussion of General Lactation Issues: Cayetano is doing well with latching, Mom states it is an improvement from her first child. He is having some difficulties particularly on the right. She still has some pinching and sometimes needs to \"suffer throughout it\" until it feels better.     Infant is 7 days old today.        History:  Fertility Problem:no  Breast changes:yes - enlargement, tenderness she got an US on her right breast for a small hard area on the outer quadrant on that side - non-concerning results   : yes - went into labor on her own, arrived 8pm and delivered by 11:22 pm, pushed for 5 minutes   Full term:yes - 39 and 3    labor:no  First nursing/attempt < 1 hour after birth:yes - latched well to the left, baby self attached   Skin to skin following delivery:yes - immediately   Breast changes after delivery:yes - day 3-4 postpartum   Rooming in (infant in room with mother with exception of procedures, eg. Circumcision: yes - just left for circ   Blood sugar issues:no  NICU stay:no  Jaundice:no  Phototherapy:no  Supplement given: (list supplement and method used as well as reason(s):no    Past Medical History:   Diagnosis Date    Asthma     exercise induced    Preeclampsia     Varicella     childhood       No current outpatient medications on file.    Allergies   Allergen Reactions    Cat Hair Extract Cough, Hives, Itching, Shortness Of Breath and Wheezing       Social History     Substance and Sexual Activity   Drug Use Never       Social History     Interval Breastfeeding History:    Frequency of breast feeding: every 2-3 hours, he's gone as long as 3-3.5 h when Mom has to wake.   Does mother feel breastfeeding is effective: Yes  Does infant appear satisfied after nursing:Yes  Stooling pattern normal: Yes  Urinating frequently:Yes  Using shield or shells: " No    Alternative/Artificial Feedings:   Bottle: No  Cup: No  Syringe/Finger: No           Formula Type: no                     Amount: n/a            Breast Milk:                      Amount: only directly from the breast            Elimination Problems: No      Equipment:    Pump            Type: Haakaa - not using with suction, manual pump provided in the hospital, Medela and Libia wearable             Frequency of Use: not yet pumping     Mom reports she is not using the haakaa with suction, she is able to collect 2 oz of milk just from dripping.       Equipment Problems: no    Mom:  Breast: Engorgement/Swelling, right breast is more engorged than the right  Nipple Assessment in General: red, flattened with the areola edema present   Mother's Awareness of Feeding Cues                 Recognizes: Yes                  Verbalizes: Yes  Support System: good support   History of Breastfeeding: First child required NS to latch for the first 3 months, then has tongue and lip release at a dentists office in [place]. He was latching without the shield after this procedure. Mom also had mastitis a few times with this baby, occurred both before and after the procedure, and is nervous to get it again this time around. He nursed for 12 mo and mom provided pumped milk for up to 14 mo.   Changes/Stressors/Violence: Mariela is here for reassurance about how feedings are going, working on how to avoid mastitis, work on latching comfortably on the right.     Concerns/Goals: Avoid mastitis/hyperlactation, and     Problems with Mom: engorged breasts     Physical Exam  Constitutional:       Appearance: Normal appearance.   HENT:      Head: Normocephalic.   Pulmonary:      Effort: Pulmonary effort is normal.   Musculoskeletal:         General: Normal range of motion.      Cervical back: Normal range of motion.   Neurological:      General: No focal deficit present.      Mental Status: She is alert and oriented to person, place, and time.    Skin:     General: Skin is warm and dry.      Capillary Refill: Capillary refill takes less than 2 seconds.   Psychiatric:         Mood and Affect: Mood normal.         Behavior: Behavior normal.         Thought Content: Thought content normal.         Judgment: Judgment normal.       Infant:  Behaviors: Alert  Color: Pink  Birth weight: 3152 g  Current weight: 3150 g    Problems with infant: tight jaw, difficult attachment to the right breast       General Appearance:  Alert, active, no distress                             Head:  Normocephalic, AFOF, sutures opposed                             Eyes:  Conjunctiva clear, no drainage                              Ears:  Normally placed, no anomolies                             Nose:  Septum intact, no drainage or erythema                           Mouth:  No lesions. Rounded palate, tongue at rest at the roof of his mouth. Lateralizes well. Cups finger fully with tongue, initially retracts for lower alveolar ridge to be palpable but eventually this resolves. Tongue maintains contact when pressure is applied to the mandible. Tight jaw, narrow gape, tongue lift achieved manually and his frenulum connects at the base of the tongue and well behind alveolar ridge.                     Neck:  Supple, symmetrical, trachea midline                 Respiratory:  No grunting, flaring, retractions, breath sounds clear and equal            Cardiovascular:  Regular rate and rhythm. No murmur. Adequate perfusion/capillary refill. Femoral pulse present                    Abdomen:   Soft, non-tender, no masses, bowel sounds present, no HSM             Genitourinary:  Normal male, testes descended, no discharge, swelling, or pain, anus patent                          Spine:   No abnormalities noted        Musculoskeletal:  Full range of motion          Skin/Hair/Nails:   Skin warm, dry, and intact, no rashes or abnormal dyspigmentation or lesions                Neurologic:   No  abnormal movement, tone appropriate for gestational age    Lino Assessment for Lingual Frenulum Function    Appearance Items Function Items   Appearance of tongue when lifted  2: Round or square   Lateralization  2: Complete   Elasticity of frenulum  1: Moderately elastic   Lift of tongue  2: Tip to mid-mouth     Length of lingual frenulum when tongue lifted  lingual frenulum length: 1: 1 cm     Extension of tongue  1: Tip over lower gum only   Attachment of lingual frenulum to tongue  2: Posterior to tip   Spread of anterior tongue  2: Complete   Attachment of lingual frenulum to inferior alveolar ridge  2: Attached to floor of mouth or well below ridge Cupping  2: Entire edge, firm cup   Ankyloglossia Grading:  Class I: mild, 12-16 mm  Class II: moderate, 8-11 mm  Class III: severe, 3-7 mm  ClassIV: complete, less than 3 mm Peristalsis  1: Partial, originating posterior to tip       SCORE:    Appearance: 8 (<8=ankyloglossia)  Function: 12 (<11=ankyloglossia) Snapback  2: None         Latch:  Efficiency:               Lips Flanged: Yes, once established both on the left and the right               Depth of latch: wide              Audible Swallow: Yes              Visible Milk: Yes - leaking from opposite breast               Wide Open/ Asymmetrical: Yes              Suck Swallow Cycle: Breathing: yes, Coordinated: yes  Nipple Assessment after latch: some flattening   Latch Problems: Mom's breasts are very engorged, RPS performed before offering him the breast. He latches well in biological nurturing on the left side easily. Wide latch observed with some positioning adjustments and gentle manipulation on his chin. On the right he is more comfortable in cross cradle and a wide latch is achieved after a few minutes. He nurses comfortably on that side for the duration of the visit and Mom's breast is much softer afterwards. Mom states he has never fed as well on the right side for that long of a period of  time.     Position:  Infant's Ergonomics/Body               Body Alignment: Yes               Head Supported: Yes               Close to Mom's body/ Lifted/ Supported: Yes               Mom's Ergonomics/Body: Yes                           Supported: Yes                           Sitting Back: Yes                           Brings Baby to her breast: Yes  Positioning Problems: reviewed laid back, Mom does this well. Observed cross cradle hold as well.       Handouts:   None     Education:  Reviewed Latch: importance of deep latch without pain.   Reviewed Positioning for Dyad: proper alignment and head angle when positioning at the breast   Reviewed Frequency/Supply & Demand: offer the breast at each feeding, pump if baby is not latching and effective transferring milk.   Reviewed Infant:Cues and varied States of Awareness: watch for hunger cues, feed on demand. If baby seems satisfied at the breast (calm, relaxed sleeping, breasts are softer) no need to pump or supplement   Reviewed Infant Elimination: goal of 6+ wets and 2-3 stools per day   Reviewed Alternative/Artificial Feedings: paced bottle feeding technique demonstrated  Reviewed Mom/Breast care: gentle handling of the breast at all times, discussed lymphatic drainage and reverse pressure softening, as well as tips for healing sore nipples.    Reviewed Equipment: Hand pump and electric pump general guidance, Discussed proper flange fit, how to measure        Plan:      Reassurance provided that baby is growing and feeding well at this time. Cont with positioning adjustments and watch for signs of effective feeding. Pump only if wanting to replace feeding at the breast with bottle feeding or if latching becomes painful. Avoid silicone manual pump use to remove excess milk. Wear supportive bra, cold compresses, and ibuprofen recommended for engorgement relief. Avoid forceful breast massage,  perform gentle handling of the breast at all times to preserve  integrity.  Contact [facility] for breastfeeding support as needed or ongoing concerns with latching comfort and milk transfer. Follow up in 2 weeks.     I have spent 90 minutes with Patient and family today in which greater than 50% of this time was spent in counseling/coordination of care regarding Patient and family education.

## 2024-02-21 NOTE — PATIENT INSTRUCTIONS
-Congrats   -Continue to feed on demand, watch for signs of effective feeding. You should feel strong, comfortable tugging, hear swallows and feel your breasts become softer after baby is finished.  -Try lymphatic drainage massage of the breast to help with your engorgement. A helpful video can be found here:  https://youtu.be/25JIuilQ1h9   -Reverse pressure softening to soften areola before latching attempts:  Engorgement Help: Reverse Pressure Softening  [employer].com   -Wear a supportive, but not tight, bra. Sit comfortably reclined when nursing or pumping, and throughout the day when possible.   -Okay to pump as needed for uncomfortable engorgement, utilize flange fit and settings that are comfortable for you, pumping should not be painful! Goal to soften the breast to comfort as needed, if baby is also feeding well around the clock try to limit pumping to avoid excess milk removal.  -Okay to collect leaking milk, be mindful to avoid any suction or additional pressure on the breast that may promote milk to flow in between feedings.  -Cold compresses, like a bag of peas applied over a thin blanket or towel to the breast, is recommended for breast comfort and decreasing inflammation in the breast. You can also take ibuprofen as needed and consider a daily probiotic.   -Healing techniques for nipples : nipple cream of choice and airflow, or apply cream and cover with a piece of wax or parchment paper over top in between feedings.   -Contact OB for fever, chills, and breast symptoms that do no resolve within 24-48 hours of supportive care.   - Storing and Thawing Breast Milk  [facility] Breastfeeding Support ([employer]gov)    -Follow up in two weeks for ongoing feeding support

## 2024-02-22 ENCOUNTER — NURSE TRIAGE (OUTPATIENT)
Age: 29
End: 2024-02-22

## 2024-02-22 DIAGNOSIS — N61.0 MASTITIS: Primary | ICD-10-CM

## 2024-02-22 LAB — PLACENTA IN STORAGE: NORMAL

## 2024-02-22 RX ORDER — CEFUROXIME AXETIL 500 MG/1
500 TABLET ORAL EVERY 12 HOURS SCHEDULED
Qty: 20 TABLET | Refills: 0 | Status: SHIPPED | OUTPATIENT
Start: 2024-02-22 | End: 2024-03-03

## 2024-02-22 NOTE — PROGRESS NOTES
I have reviewed the notes, assessments, and/or procedures performed by Fatuma Law RN, IBCLC, I concur with her/his documentation of Mariela Silvestre MD 02/21/24

## 2024-02-22 NOTE — TELEPHONE ENCOUNTER
"Patient called with c/o red, tender left breast.  Stated it is warm to the touch.  She had mastitis with her first pregnancy.     Chills started around 8pm last night.  She only took motrin this morning.  Temperature when on the phone was 101.  Advised patient to take a dose of tylenol.  Appointment made to be seen in the office today for further evaluation.      Patient agreeable with plan of care.  Will go to office today    Reason for Disposition   Fever > 100.4 F (38.0 C)    Answer Assessment - Initial Assessment Questions  1. PAIN: \"How bad is the pain?\"  (Scale 1-10; or mild, moderate, severe)    - MILD - doesn't interfere with normal activities     - MODERATE - interferes with normal activities or awakens from sleep     - SEVERE - excruciating pain, unable to do any normal activities       Severe    2. SKIN: \"Does the skin appear red?\"        Yes    3. LOCATION: \"Which breast?\" (e.g., left, right, both)      Left    4. DELIVERY: \"When was the baby born?\"       02/14/2026    5. BREASTFEEDING: \"Have you been breastfeeding?\" If yes, ask: \"When did you stop?\"      Yes    6. ONSET: \"When did the breast pain start?\" (e.g., hours, days)      Last night    7. FEVER: \"Do you have a fever?\" If Yes, ask: \"What is it, how was it measured, and when did it start?\"      N/a    8. OTHER SYMPTOMS: \"Do you have any other symptoms?\" (e.g., abdominal pain, feeling sad or depressed, weakness)      Chills, tender to touch, warm    Protocols used: Postpartum - Breast Pain and Engorgement-ADULT-[place]    "

## 2024-02-22 NOTE — PROGRESS NOTES
Patient called regarding mastitis symptoms. Temperature of 101 noted today while on discussing with nurse for triage. Patient also reports erythema and tenderness of the breast. Offered recommendations regarding engorgement and breast tenderness. Recommended to continue feeding  off of affected breast.     Prescription for Ceftin 500mg q12hr for 10 days called in to pharmacy on file ([facility] in [place]) for patient to  today.     Patient appreciative of call.

## 2024-03-06 ENCOUNTER — OFFICE VISIT (OUTPATIENT)
Dept: POSTPARTUM | Facility: CLINIC | Age: 29
End: 2024-03-06

## 2024-03-06 NOTE — PROGRESS NOTES
"BREAST FEEDING FOLLOW UP VISIT    Informant/Relationship: Mariela (mom/self)     Discussion of General Lactation Issues: Mariela was treated for mastitis on the left side. She contacted her OB who prescribed abx . It is tough to burp him. Baby is having some \"weird breathing\" at night - sounds like congestion or cracking. Overall feedings are going well. Breast engorgement is improved.     Infant is 3 weeks  old today.    Interval Breastfeeding History:    Frequency of breast feeding: every 2.5-3 h, usually a 4-4.5 h stretch overnight   Does mother feel breastfeeding is effective: Yes  Does infant appear satisfied after nursing:Yes  Stooling pattern normal:Yes  Urinating frequently:Yes  Using shield or shells:No    Alternative/Artificial Feedings:   Bottle: No  Cup: No  Syringe/Finger: No           Formula Type: no                     Amount: n/a            Breast Milk:                      Amount: only directly from the breast         Elimination Problems: No      Equipment:    Pump            Type: Medela and Libia Stride             Frequency of Use: she will pump for about 5 minutes to soften the breast when Cayetano does nurse as well on the second.     Equipment Problems: no      Mom:  Breast: Normal, full feeling, rounded, non-tender. Mild engorgement, softer after feedings.   Nipple Assessment in General: Normal: elongated/eraser, no discoloration and no damage noted.  Mother's Awareness of Feeding Cues                 Recognizes: Yes                  Verbalizes: Yes  Support System: good support   History of Breastfeeding: First child required NS to latch for the first 3 months, then has tongue and lip release at a dentists office in [place]. He was latching without the shield after this procedure. Mom also had mastitis a few times with this baby, occurred both before and after the procedure, and is nervous to get it again this time around. He nursed for 12 mo and mom provided pumped milk for up to 14 mo. "   Changes/Stressors/Violence: Mariela has some general questions. Have latch checked again. She had mastitis after our past visit which was treated and symptoms are improved.   Concerns/Goals: Continue to progress with breastfeeding.     Problems with Mom: anxious     Physical Exam  Constitutional:       Appearance: Normal appearance.   Pulmonary:      Effort: Pulmonary effort is normal.   Musculoskeletal:         General: Normal range of motion.      Cervical back: Normal range of motion.   Neurological:      General: No focal deficit present.      Mental Status: She is alert and oriented to person, place, and time.   Skin:     General: Skin is warm.      Capillary Refill: Capillary refill takes less than 2 seconds.   Psychiatric:         Mood and Affect: Mood normal.         Behavior: Behavior normal.         Thought Content: Thought content normal.         Judgment: Judgment normal.         Infant:  Behaviors: Alert, fussy  Color: Pink  Birth weight: 3152 g  Current weight: 3860 g     Problems with infant:  somewhat high tone, tight jaw       General Appearance:  Alert, active, no distress                             Head:  Normocephalic, AFOF, sutures opposed                             Eyes:  Conjunctiva clear, no drainage                              Ears:  Normally placed, no anomolies                             Nose:  Septum intact, no drainage or erythema                           Mouth:  No lesions. Tongue at rest at the roof of his mouth. Lateralizes well, extends past lipline. When sucking on examiners finger, tongue retracts and alveolar ridge is palpable - reassessed after he nurses and he is much more relaxed - cups finger more gently and sucks with peristaltic motion.                     Neck:  Supple, symmetrical, trachea midline                 Respiratory:  No grunting, flaring, retractions, breath sounds clear and equal            Cardiovascular:  Regular rate and rhythm. No murmur. Adequate  perfusion/capillary refill. Femoral pulse present                    Abdomen:   Soft, non-tender, no masses, bowel sounds present, no HSM             Genitourinary:  Normal male, testes descended, no discharge, swelling, or pain, anus patent                          Spine:   No abnormalities noted        Musculoskeletal:  Full range of motion          Skin/Hair/Nails:   Skin warm, dry, and intact, no rashes or abnormal dyspigmentation or lesions                Neurologic:   No abnormal movement, tone appropriate for gestational age     Latch:  Efficiency:               Lips Flanged: Yes, with adjustments               Depth of latch: wider with positioning adjustments               Audible Swallow: Yes, with every suck               Visible Milk: Yes              Wide Open/ Asymmetrical: Yes              Suck Swallow Cycle: Breathing: yes, Coordinated: yes  Nipple Assessment after latch: Normal: elongated/eraser, no discoloration and no damage noted.  Latch Problems: He is narrow initially but is able to be unlatched and allowed to gape wider before offering the breast. Wide latch achieved with regular swallows and rocker jaw motion. Mom denies pain.        Weighted feeding with weight double check. Baby is +165 g after nursing on both breasts.       Position:  Infant's Ergonomics/Body               Body Alignment: Yes               Head Supported: Yes               Close to Mom's body/ Lifted/ Supported: Yes               Mom's Ergonomics/Body: Yes                           Supported: Yes                           Sitting Back: Yes                           Brings Baby to her breast: Yes  Positioning Problems: none, he latches well in cross cradle. Reminded Mom to sit comfortably reclined.       Handouts:   None today     Education:  Reviewed Latch: importance of deep latch without pain.   Reviewed Positioning for Dyad: proper alignment and head angle when positioning at the breast   Reviewed  Frequency/Supply & Demand: offer the breast at each feeding, pump if baby is not latching and effective transferring milk.   Reviewed Infant:Cues and varied States of Awareness: watch for hunger cues, feed on demand. If baby seems satisfied at the breast (calm, relaxed sleeping, breasts are softer) no need to pump or supplement   Reviewed Infant Elimination: goal of 6+ wets and 2-3 stools per day   Reviewed Alternative/Artificial Feedings: paced bottle feeding technique demonstrated  Reviewed Mom/Breast care: gentle handling of the breast at all times, how to manage engorgement with cold compress, ibuprofen and daily probiotic.   Reviewed Equipment: Hand pump and electric pump general guidance, Discussed proper flange fit, how to measure        Plan:      Reassurance provided that baby is growing well at this time. Cont with good positioning technique and watch for signs of effective feeding. Pump only if wanting to replace feeding at the breast with bottle feeding or for painful engorgement. Gentle handling of the breast at all times to preserve integrity.  Contact [facility] for breastfeeding support as needed or ongoing concerns with latching comfort and milk transfer.     I have spent 60 minutes with Patient and family today in which greater than 50% of this time was spent in counseling/coordination of care regarding Patient and family education.

## 2024-03-06 NOTE — PATIENT INSTRUCTIONS
-Watch for signs throughout the feeding that baby is effectively removing milk. You will feel strong tugging, hear swallowing and will feel your breasts get softer after nursing.   -No need to pump if baby is latching and feeding well at the breast on demand.   -Pump only as needed for missed feedings at the breast or for uncomfortable engorgement, utilize flange fit and settings that are comfortable for you, pumping should not be painful!   -Your nipples measured today at 18 mm on each side.  https://www.myEnergyPlatform.com/en-us/perfecting-size-and-fit , https://www.[employer]/en-us/breastfeeding-pumping/articles/pumping-tips/choosing-your-medela-breast-shield-size   -Wear a supportive, but not tight, bra in between feedings or to support your wearable pump. Sit comfortably reclined when nursing or pumping, and throughout the day when possible.   -Okay to pump as needed for uncomfortable engorgement, utilize flange fit and settings that are comfortable for you, pumping should not be painful! Goal to soften the breast to comfort as needed, if baby is also feeding well around the clock try to limit pumping to avoid excess milk removal.  -Okay to collect leaking milk, be mindful to avoid any suction or additional pressure on the breast that may promote milk to flow in between feedings.  -Cold compresses, like a bag of peas applied over a thin blanket or towel to the breast, is recommended for breast comfort and decreasing inflammation in the breast. You can also take ibuprofen as needed and consider a daily probiotic.   - Storing and Thawing Breast Milk  [facility] Breastfeeding Support (usda.gov)    -Please call or scheduled a follow up appointment with lactation as needed for questions or concerns you may have.

## 2024-03-17 NOTE — PROGRESS NOTES
I have reviewed the notes, assessments, and/or procedures performed by Fatuma Law RN, IBCLC, I concur with her/his documentation of Mariela Silvestre MD 03/17/24

## 2024-03-29 ENCOUNTER — POSTPARTUM VISIT (OUTPATIENT)
Dept: OBGYN CLINIC | Facility: CLINIC | Age: 29
End: 2024-03-29

## 2024-03-29 VITALS
BODY MASS INDEX: 24.64 KG/M2 | HEIGHT: 67 IN | DIASTOLIC BLOOD PRESSURE: 74 MMHG | WEIGHT: 157 LBS | SYSTOLIC BLOOD PRESSURE: 110 MMHG

## 2024-03-29 PROCEDURE — 99024 POSTOP FOLLOW-UP VISIT: CPT | Performed by: PHYSICIAN ASSISTANT

## 2024-03-29 NOTE — PROGRESS NOTES
Assessment/Plan     Normal postpartum exam.     1. Contraception: none - considering KYLEENA IUD  2. Annual exam due in September.   3. Increase activity as tolerated, may resume all normal activity.  4. Lactation consult needed: no; if yes, [facility] information discussed.     Encounter Diagnosis     ICD-10-CM    1. Postpartum care following vaginal delivery  Z39.2       2. Proteinuria in pregnancy, delivered  O12.14     seeing nephrology next week. [facility] had recommended postpartum 24hour urine. Will defer to nephrology as patient is following with them            Subjective   Chief Complaint   Patient presents with    Postpartum Care     Patient is here today for her 6 week postpartum follow up,  , baby boy(Cayetano), 6lbs 15.2oz. Patient would like to discuss BC today, she is currently breastfeeding and that is going well. She states she is struggling a little emotionally at the moment.        Mariela Mtz is a 28 y.o.  female who presents for a postpartum visit.   Mariela states she is feeling emotional but otherwise doing well. She reports that she feels she is getting better emotionally over the last week. It was just a lot having a toddler and a . She has support at home and is excited to get back to her activity. She denies SI/HI. She denies hearing or seeing things that other people do not see or hear.    Delivery Method: Vaginal, Spontaneous    Delivery Date and Time: 2024  11:22 PM   Delivery Attending: Evelyn Valenzuela   Gestational Age at delivery: 39w3d   Route of Delivery: Vaginal, Spontaneous       Admitting Diagnoses:   Patient Active Problem List   Diagnosis    Proteinuria affecting pregnancy in third trimester    History of pre-eclampsia in prior pregnancy, currently pregnant    39 weeks gestation of pregnancy    History of pre-eclampsia in prior pregnancy, currently pregnant, third trimester     (spontaneous vaginal delivery)       Gestational Diabetes:  "no  Pregnancy Complications:  proteinuria    Anesthesia: Epidural ,     Delivery: Vaginal, Spontaneous  at 2024  11:22 PM   Laceration: Perineal: 2°  Repaired? Yes     Baby's Name: Cayetano  Baby's Weight: 3152 g (6 lb 15.2 oz) ; 111.2     Apgar scores: 9  and 9  at 1 and 5 minutes, respectively  Breast or formula: Breastfeeding  Pediatrician: [facility]    Bleeding no bleeding. Bowel function: normal. Bladder function: normal. The patient is not having any pain.     Patient has been sexually active. Desired contraception method is  considering IUD .     Postpartum depression screening: positive. EPDS : 8. She denies depression/anxiety/trouble sleeping.     Last Pap :  ; no abnormalities      The following portions of the patient's history were reviewed and updated as appropriate: allergies, current medications, past family history, past medical history, past social history, past surgical history, and problem list.    No current outpatient medications on file.    Allergies   Allergen Reactions    Cat Hair Extract Cough, Hives, Itching, Shortness Of Breath and Wheezing       Review of Systems  Review of Systems   Constitutional:  Negative for chills, fever and unexpected weight change.   Respiratory:  Negative for shortness of breath.    Cardiovascular:  Negative for chest pain.   Gastrointestinal:  Negative for abdominal pain, diarrhea, nausea and vomiting.   Skin:  Negative for rash.   Psychiatric/Behavioral:  Negative for dysphoric mood. The patient is not nervous/anxious.          Objective      /74 (BP Location: Right arm, Patient Position: Sitting, Cuff Size: Standard)   Ht 5' 7\" (1.702 m)   Wt 71.2 kg (157 lb)   LMP 2023 (Exact Date)   Breastfeeding Yes   BMI 24.59 kg/m²     Physical Exam  Constitutional:       General: She is not in acute distress.     Appearance: Normal appearance. She is not ill-appearing, toxic-appearing or diaphoretic.   HENT:      Head: Normocephalic and " atraumatic.   Pulmonary:      Effort: Pulmonary effort is normal.   Neurological:      General: No focal deficit present.      Mental Status: She is alert.   Skin:     General: Skin is warm and dry.   Psychiatric:         Mood and Affect: Mood normal.         Behavior: Behavior normal.   Vitals reviewed.

## 2024-04-09 ENCOUNTER — TELEPHONE (OUTPATIENT)
Dept: POSTPARTUM | Facility: CLINIC | Age: 29
End: 2024-04-09

## 2024-04-09 NOTE — TELEPHONE ENCOUNTER
Mariela called - she is asking for tips on helping baby take a bottle, mom looking to get ready for when returns to work.     Cayetano 7wks

## 2024-04-09 NOTE — TELEPHONE ENCOUNTER
"Mariela has been offering feedings from a bottle for the last week.  Cayetano did not accept the feedings.  He makes a \"face\" when the nipple is introduced into his mouth.  He does not suck on the teat. He has not taken any milk from the bottle during any of the attempts.    They have tried several bottles and several different positions with the same result.  Breastfeeding is going well overall although Cayetano does struggle to coordinate SSB when flow is faster.   I suggested that she continue with gentle consistent attempts.  I suggested she try a side lying position as this is similar to positioning for breastfeeding.  I offered an appointment for follow up with us if he continues to refuse the bottle or I suggested that she request a speech therapy referral from his Peds.  I encouraged her to call back with any additional questions or concerns.  "

## 2024-05-02 ENCOUNTER — OFFICE VISIT (OUTPATIENT)
Dept: POSTPARTUM | Facility: CLINIC | Age: 29
End: 2024-05-02
Payer: COMMERCIAL

## 2024-05-02 PROCEDURE — 99404 PREV MED CNSL INDIV APPRX 60: CPT | Performed by: PEDIATRICS

## 2024-05-02 NOTE — PATIENT INSTRUCTIONS
"-Great meeting with you and good luck with your return to work!   -When offering [place] the bottle, take a very relaxed approach. I recommend being consistent with whichever intervention you're working on at any time. Try for at least 4-5 feedings before deciding to work on something different.   -bottles recommended will have a nipple with a medium width base that gradually gets more narrow towards the nip. Some examples of these are the Lansinoh Momma, Evenflo Balance, and [place] nipple.   -No need to pump if Cayetano is with you and nursing well around the clock. After a few days of having more \"heaviness\" in the breast, your supply will regulate to be more in tune with what his demands are.  -Okay to pump just before you go to bed if desired - this is a way to obtain bonus milk and not disrupt your sleep. If Cayetano is getting good rest you should be too!   -Call for follow up questions or to scheduled a follow up visit as needed.   "

## 2024-05-02 NOTE — PROGRESS NOTES
BREAST FEEDING FOLLOW UP VISIT    Informant/Relationship: Mariela (mom/self), Ricardo (FOB)     Discussion of General Lactation Issues: Baby Cayetano has been feed well at the breast, but he is not accepting bottles. He will play with the bottle, and just smile and look at her during attempts. Her mother in law has had some limited success. Mom returns to work on the  and her Mom will be helping during this time. They attempted for the first time when Cayetano was 6-7 weeks old. Cayetano isn't fussy or frustrated, he just often will not latch to it.     Mom reports a slight color change on the top of her nipple after baby nurses and after pumping, she denies pain or nipple damage.     Mom works 45min - 1h away from home and hours are 8-5:30 pm. She works 5 days a week.     Infant is 78 days  old today.    Interval Breastfeeding History:    Frequency of breast feedin-0 times during the night, about 8 times during the day - demand feedings   Does mother feel breastfeeding is effective: Yes  Does infant appear satisfied after nursing:Yes  Stooling pattern normal:Yes  Urinating frequently:Yes  Using shield or shells:No    Alternative/Artificial Feedings:   Bottle: Yes, Fernanda Hawley Munchkin Latch, Dr. Blair's wide neck. MIL got baby     Trying daily with bottles. The past few days Mom tries with the bottle before she nurses him - at least 4 bottles per day.     Cup: No  Syringe/Finger: Yes, attempted with 5 mL syringe at times            Formula Type: no                     Amount: n/a            Breast Milk:                      Amount: only directly from the breast             Frequency Q not accepting bottles   Elimination Problems: Refusing bottles, does not latch on to the bottle. Had one successful attempt recently with grandma       Equipment:    Pump            Type: Medela and Libia Stride             Frequency of Use: using at 4 am when Cayetano sleeps through the night. Also using the pump for a few  "minutes if the breast still has some \"heaviness\" after he nurses       Equipment Problems: no      Mom:  Breast: Not performed today  Mother's Awareness of Feeding Cues                 Recognizes: Yes                  Verbalizes: Yes  Support System: good support   History of Breastfeeding: First child required NS to latch for the first 3 months, then has tongue and lip release at a dentists office in [place]. He was latching without the shield after this procedure. Mom also had mastitis a few times with this baby, occurred both before and after the procedure, and is nervous to get it again this time around. He nursed for 12 mo and mom provided pumped milk for up to 14 mo.    Changes/Stressors/Violence: Mom returns to work on 5/9 and Cayetano has not take a full feeding from the bottle.   Concerns/Goals: Continue to progress with breastfeeding, have success with Cayetano accepting a bottle.     Problems with Mom: hyperlactation    Physical Exam  Constitutional:       Appearance: Normal appearance.   HENT:      Head: Normocephalic.   Pulmonary:      Effort: Pulmonary effort is normal.   Musculoskeletal:         General: Normal range of motion.      Cervical back: Normal range of motion.   Neurological:      General: No focal deficit present.      Mental Status: She is alert and oriented to person, place, and time.   Skin:     General: Skin is warm and dry.      Capillary Refill: Capillary refill takes less than 2 seconds.   Psychiatric:         Mood and Affect: Mood normal.         Behavior: Behavior normal.         Thought Content: Thought content normal.         Judgment: Judgment normal.         Infant:  Behaviors: Alert, fussy. Sleeping once given to Mom   Color: Pink  Birth weight: 3152 g  Current weight: 6140 g     Problems with infant: not accepting bottles       General Appearance:  Alert, active, no distress                             Head:  Normocephalic, AFOF, sutures opposed                             Eyes:  " Conjunctiva clear, no drainage                              Ears:  Normally placed, no anomolies                             Nose:  Septum intact, no drainage or erythema                           Mouth:  No lesions. Tongue at rest at the roof of his mouth. He was not willing to participate in an oral exam, fussy and crying.                     Neck:  Supple, symmetrical, trachea midline,                 Respiratory:  No grunting, flaring, retractions, breath sounds clear and equal            Cardiovascular:  Regular rate and rhythm. No murmur. Adequate perfusion/capillary refill. Femoral pulse present                    Abdomen:   Soft, non-tender, no masses, bowel sounds present, no HSM             Genitourinary:  Normal male, testes descended, no discharge, swelling, or pain, anus patent                          Spine:   No abnormalities noted        Musculoskeletal:  Full range of motion          Skin/Hair/Nails:   Skin warm, dry, and intact, no rashes or abnormal dyspigmentation or lesions                Neurologic:   No abnormal movement, tone appropriate for gestational age     Latch:   Did not observed Cayetaon on the breast today. He did not show hunger cues after bottle feeding attempt, he was asleep in Mom's arms.     Offered warmed breast milk with Dr. Blair's bottle, this was the most appropriate shape of the bottles available. Cayetano was calm during the attempt, he was willing to open wide and appears to have flanged lips around the bottle but tongue does not cup or establish any sucking. He overall does not appear to be very hungry, Mom states he was fed around 2 hours prior to the visit.       Handouts:   Speech consult handout.     Education:    Reviewed Frequency/Supply & Demand: tips on how to safely downregulate her supply   Reviewed Infant:Cues and varied States of Awareness: responsive/paced bottle feeding   Reviewed Alternative/Artificial Feedings: bottle volumes, cup feeding, syringe  feeding, how SLP can support bottle feeding if needed   Reviewed Mom/Breast care: pumping to replace missed feedings at the breast, do not allow breasts to become uncomfortably engorged.   Reviewed Equipment: bottles, feeding up, syringe, pump       Plan:  Offer [place] the breast on demand when with him. When trying with the bottle, do so when he is calm but hungry. Try different nipple discussed, medium width base of the nipple that gradually becomes more narrow. Warm milk, offer with different flow rates, try movement when offering. Allow several attempts with the bottle over the course of the day for consistency. Recruit another caretaker besides Mom if possible for bottle feeding sessions. Pump to replace missed feedings at the breast. Follow up as needed for ongoing support once obtaining different equipment if desired. Speech consult placed for baby if Mom feels these interventions are not helpful in getting him to take a bottle.     I have spent 90 minutes with Patient and family today in which greater than 50% of this time was spent in counseling/coordination of care regarding Patient and family education.

## 2024-05-03 NOTE — PROGRESS NOTES
I have reviewed the notes, assessments, and/or procedures performed by Fatuma Law RN, IBCLC, I concur with her/his documentation of Mariela Silvestre MD 05/03/24

## 2024-05-07 ENCOUNTER — CONSULT (OUTPATIENT)
Dept: NEPHROLOGY | Facility: CLINIC | Age: 29
End: 2024-05-07
Payer: COMMERCIAL

## 2024-05-07 DIAGNOSIS — O12.12 PROTEINURIA AFFECTING PREGNANCY IN SECOND TRIMESTER: ICD-10-CM

## 2024-05-07 LAB
SL AMB  POCT GLUCOSE, UA: NORMAL
SL AMB LEUKOCYTE ESTERASE,UA: NORMAL
SL AMB POCT BILIRUBIN,UA: NORMAL
SL AMB POCT BLOOD,UA: NORMAL
SL AMB POCT CLARITY,UA: CLEAR
SL AMB POCT COLOR,UA: YELLOW
SL AMB POCT KETONES,UA: NORMAL
SL AMB POCT NITRITE,UA: NORMAL
SL AMB POCT PH,UA: 7
SL AMB POCT SPECIFIC GRAVITY,UA: 1
SL AMB POCT URINE PROTEIN: NORMAL
SL AMB POCT UROBILINOGEN: 0.2

## 2024-05-07 PROCEDURE — 99243 OFF/OP CNSLTJ NEW/EST LOW 30: CPT | Performed by: STUDENT IN AN ORGANIZED HEALTH CARE EDUCATION/TRAINING PROGRAM

## 2024-05-07 PROCEDURE — 81002 URINALYSIS NONAUTO W/O SCOPE: CPT | Performed by: STUDENT IN AN ORGANIZED HEALTH CARE EDUCATION/TRAINING PROGRAM

## 2024-05-07 NOTE — PATIENT INSTRUCTIONS
Thank you for coming to your visit today. As we discussed you kidney function is normal.  You have proteins in the urine in October last year.  We need to repeat your urine test       Recommend low sodium (salt) food    Try to exercise at least 30 minutes 3 days a week to begin with with an ultimate goal of 5 days a week for at least 30 minutes      Next Visit in 2 months with results   If you need to see us earlier we can change the appointment for you      Joselyn Reyes Bahamonde, MD  Nephrology Attending

## 2024-05-07 NOTE — PROGRESS NOTES
NEPHROLOGY OUTPATIENT CONSULTATION   Mariela Mtz 28 y.o. female MRN: [id number]  Date: 5/9/2024  Reason for consultation:   Chief Complaint   Patient presents with    Consult       ASSESSMENT AND PLAN:  28 y.o. woman with PMH of preeclampsia with her first pregnancy with proteinuria never check in between pregnancies.  Just had a baby 3 months ago she did not develop her eclampsia but developed lower extremity edema.  With proteinuria of 450 mg in 24 hours who presents for initial consultation for proteinuria after pregnancy    PLAN:    #Volume status/hypertension:  Volume: Euvolemic  Blood pressure: Borderline hypertension, /80, goal less than 140/90  Recommend:  Low-sodium diet  Not taking BP meds at this time  After preeclampsia she required BP meds for a few weeks then resolved  Recommend BP monitor at home and message me with results at  Risk of developing hypertension due to history of preeclampsia    # Nonnephrotic proteinuria  Proteinuria with first pregnancy in the settings of preeclampsia  No follow-up of proteinuria after delivery  Patient became pregnant and with no proteinuria levels.  Proteinuria was checked during pregnancy and showed 450 mg in 24 hours  Plan to repeat UACR and UPCR  Might require kidney biopsy  Etiology: Possible secondary to TMA in the settings of preeclampsia with chronic changes versus membranous  Will check complements    # History of preeclampsia  And risk of developing hypertension as above  Proteinuria possible TMA with chronic changes  Discussed high risk of preeclampsia in future pregnancies    # Pregnancy counseling  We discussed future pregnancies  Patient aware of risk of preeclampsia, low birthweight, premature delivery in the settings of proteinuria  Advised patient to control proteinuria better before planning a third pregnancy        HISTORY OF PRESENT ILLNESS:  Mariela Mtz is a 28 y.o. female with medical issues of history of preeclampsia with her first  "pregnancy with proteinuria never check in between pregnancies.  Just had a baby 3 months ago she did not develop her eclampsia but developed lower extremity edema with proteinuria of 450 mg in 24 hours who presents for initial consultation for proteinuria after pregnancy    REVIEW OF SYSTEMS:    More than 10 point review of systems were obtained and discussed in length with the patient. Complete review of systems were negative / unremarkable except mentioned in the HPI section.    Review of Systems - Psychological ROS: negative  Ophthalmic ROS: negative  ENT ROS: negative  Hematological and Lymphatic ROS: negative  Endocrine ROS: negative  Respiratory ROS: no cough, shortness of breath, or wheezing  Cardiovascular ROS: no chest pain or dyspnea on exertion  Gastrointestinal ROS: no abdominal pain, change in bowel habits, or black or bloody stools  Genito-Urinary ROS: no dysuria, trouble voiding, or hematuria  Musculoskeletal ROS: negative  Neurological ROS: no TIA or stroke symptoms  Dermatological ROS: negative     PHYSICAL EXAM:  Vitals:    05/07/24 1207   Weight: 70.3 kg (155 lb)   Height: 5' 7\" (1.702 m)     Body mass index is 24.28 kg/m².    Physical Exam  General:  no acute distress at this time  Skin:  No acute rash  Eyes:  No scleral icterus and noninjected  ENT:  mucous membranes moist  Neck:  no carotid bruits  Chest:  Clear to auscultation percussion, good respiratory effort, no use of accessory respiratory muscles  CVS:  Regular rate and rhythm without rub   Abdomen:  soft and nontender   Extremities: no significant lower extremity edema  Neuro:  No gross focality  Psych:  Alert , cooperative       PAST MEDICAL HISTORY:  Past Medical History:   Diagnosis Date    Asthma     exercise induced    Preeclampsia     Varicella     childhood       PAST SURGICAL HISTORY:  Past Surgical History:   Procedure Laterality Date    WISDOM TOOTH EXTRACTION         ALLERGIES:  Allergies   Allergen Reactions    Cat Hair " "Extract Cough, Hives, Itching, Shortness Of Breath and Wheezing       SOCIAL HISTORY:  Social History     Substance and Sexual Activity   Alcohol Use Not Currently     Social History     Substance and Sexual Activity   Drug Use Never     Social History     Tobacco Use   Smoking Status Never   Smokeless Tobacco Never       FAMILY HISTORY:  Family History   Problem Relation Age of Onset    No Known Problems Mother     No Known Problems Father     No Known Problems Sister     Diabetes type II Maternal Grandmother     Skin cancer Maternal Grandfather     Prostate cancer Maternal Grandfather     No Known Problems Paternal Grandmother     Emphysema Paternal Grandfather     COPD Paternal Grandfather        MEDICATIONS:  No current outpatient medications on file.    Lab Results:   Results for orders placed or performed in visit on 05/07/24   POCT urine dip   Result Value Ref Range    LEUKOCYTE ESTERASE,UA neg     NITRITE,UA neg     SL AMB POCT UROBILINOGEN 0.2     POCT URINE PROTEIN trace      PH,UA 7.0     BLOOD,UA small     SPECIFIC GRAVITY,UA 1.000     KETONES,UA neg     BILIRUBIN,UA neg     GLUCOSE, UA neg      COLOR,UA yellow     CLARITY,UA clear        Portions of the record may have been created with voice recognition software. Occasional wrong word or \"sound a like\" substitutions may have occurred due to the inherent limitations of voice recognition software. Read the chart carefully and recognize, using context, where substitutions have occurred.    "

## 2024-05-08 ENCOUNTER — APPOINTMENT (OUTPATIENT)
Dept: LAB | Facility: CLINIC | Age: 29
End: 2024-05-08
Payer: COMMERCIAL

## 2024-05-08 DIAGNOSIS — O12.12 PROTEINURIA AFFECTING PREGNANCY IN SECOND TRIMESTER: ICD-10-CM

## 2024-05-08 LAB
ALBUMIN SERPL BCP-MCNC: 4.3 G/DL (ref 3.5–5)
ALP SERPL-CCNC: 69 U/L (ref 34–104)
ALT SERPL W P-5'-P-CCNC: 21 U/L (ref 7–52)
ANION GAP SERPL CALCULATED.3IONS-SCNC: 5 MMOL/L (ref 4–13)
AST SERPL W P-5'-P-CCNC: 21 U/L (ref 13–39)
BACTERIA UR QL AUTO: ABNORMAL /HPF
BILIRUB SERPL-MCNC: 0.4 MG/DL (ref 0.2–1)
BILIRUB UR QL STRIP: NEGATIVE
BUN SERPL-MCNC: 21 MG/DL (ref 5–25)
CALCIUM SERPL-MCNC: 9.5 MG/DL (ref 8.4–10.2)
CHLORIDE SERPL-SCNC: 105 MMOL/L (ref 96–108)
CLARITY UR: CLEAR
CO2 SERPL-SCNC: 29 MMOL/L (ref 21–32)
COLOR UR: COLORLESS
CREAT SERPL-MCNC: 0.64 MG/DL (ref 0.6–1.3)
CREAT UR-MCNC: 22.3 MG/DL
CREAT UR-MCNC: 23.9 MG/DL
GFR SERPL CREATININE-BSD FRML MDRD: 120 ML/MIN/1.73SQ M
GLUCOSE SERPL-MCNC: 91 MG/DL (ref 65–140)
GLUCOSE UR STRIP-MCNC: NEGATIVE MG/DL
HGB UR QL STRIP.AUTO: ABNORMAL
KETONES UR STRIP-MCNC: NEGATIVE MG/DL
LEUKOCYTE ESTERASE UR QL STRIP: NEGATIVE
MICROALBUMIN UR-MCNC: 135.2 MG/L
MICROALBUMIN/CREAT 24H UR: 566 MG/G CREATININE (ref 0–30)
NITRITE UR QL STRIP: NEGATIVE
NON-SQ EPI CELLS URNS QL MICRO: ABNORMAL /HPF
PH UR STRIP.AUTO: 6.5 [PH]
POTASSIUM SERPL-SCNC: 4.6 MMOL/L (ref 3.5–5.3)
PROT SERPL-MCNC: 6.9 G/DL (ref 6.4–8.4)
PROT UR STRIP-MCNC: ABNORMAL MG/DL
PROT UR-MCNC: 19 MG/DL
PROT/CREAT UR: 0.85 MG/G{CREAT} (ref 0–0.1)
RBC #/AREA URNS AUTO: ABNORMAL /HPF
SODIUM SERPL-SCNC: 139 MMOL/L (ref 135–147)
SP GR UR STRIP.AUTO: 1.01 (ref 1–1.03)
UROBILINOGEN UR STRIP-ACNC: <2 MG/DL
WBC #/AREA URNS AUTO: ABNORMAL /HPF

## 2024-05-08 PROCEDURE — 36415 COLL VENOUS BLD VENIPUNCTURE: CPT

## 2024-05-08 PROCEDURE — 82570 ASSAY OF URINE CREATININE: CPT

## 2024-05-08 PROCEDURE — 81001 URINALYSIS AUTO W/SCOPE: CPT

## 2024-05-08 PROCEDURE — 84156 ASSAY OF PROTEIN URINE: CPT

## 2024-05-08 PROCEDURE — 82043 UR ALBUMIN QUANTITATIVE: CPT

## 2024-05-08 PROCEDURE — 80053 COMPREHEN METABOLIC PANEL: CPT

## 2024-05-09 VITALS
DIASTOLIC BLOOD PRESSURE: 80 MMHG | WEIGHT: 155 LBS | BODY MASS INDEX: 24.33 KG/M2 | SYSTOLIC BLOOD PRESSURE: 140 MMHG | HEIGHT: 67 IN

## 2024-06-11 ENCOUNTER — NURSE TRIAGE (OUTPATIENT)
Age: 29
End: 2024-06-11

## 2024-06-11 ENCOUNTER — OFFICE VISIT (OUTPATIENT)
Dept: OBGYN CLINIC | Facility: CLINIC | Age: 29
End: 2024-06-11
Payer: COMMERCIAL

## 2024-06-11 VITALS
HEIGHT: 67 IN | SYSTOLIC BLOOD PRESSURE: 142 MMHG | WEIGHT: 149 LBS | DIASTOLIC BLOOD PRESSURE: 86 MMHG | BODY MASS INDEX: 23.39 KG/M2

## 2024-06-11 PROCEDURE — 99214 OFFICE O/P EST MOD 30 MIN: CPT | Performed by: OBSTETRICS & GYNECOLOGY

## 2024-06-11 RX ORDER — SERTRALINE HYDROCHLORIDE 25 MG/1
25 TABLET, FILM COATED ORAL DAILY
Qty: 30 TABLET | Refills: 3 | Status: SHIPPED | OUTPATIENT
Start: 2024-06-11

## 2024-06-11 NOTE — TELEPHONE ENCOUNTER
"RN placed a call to patient via triage call queue. Pt delivered via  on 24, is concerned with postpartum depression symptoms. Pt states she had to go back to work, but her baby is refusing the bottle, so currently being syringe fed at home. Pt feeling sad and guilty and very emotional. Pt states she feels a mix of being able to go about her day and normal activities, to becoming unmotivated. Pt denies thoughts of self harm or hurting baby/others, or any suicidal ideations or plans. Pt states she believes the delivery, going back to work, and her mother also recently leaving to go back home have been major stressors contributing to emotions and depression. RN advised will work with staff to obtain visit with Obgyn provider (PEP also notified practice clerical team). Did advise patient that staff will call back with appointment for her to speak with provider regarding management of symptoms. However, if between now and then she becomes worse or has self harm thoughts of harming baby/others - call crisis or go to ED for immediate support and treatment. Pt verbalized an understanding. No further questions at this time.       Reason for Disposition   Depression symptoms occurring > 1 month after delivery    Answer Assessment - Initial Assessment Questions  1. CONCERN: \"What happened that made you call today?\"      Postpartum concerns  2. DEPRESSION SYMPTOM SCREENING: \"How are you feeling overall?\" (e.g., decreased energy, increased sleeping or difficulty sleeping, difficulty concentrating, feelings of sadness, guilt, hopelessness, or worthlessness; problems caring for baby)      Overwhelmed, pressure with baby not taking bottle, emotional, sadness, depression, guilt.  3. RISK OF HARM - SUICIDAL IDEATION:  \"Do you ever have thoughts of hurting yourself or the baby?\"  (e.g., yes, no; details).    - INTENT:  \"Do you have thoughts of hurting yourself or the baby right NOW?\" (e.g., yes, no, N/A)    - PLAN: \"Do you " "have a specific plan for how you would do this?\" (e.g., gun, knife, overdose, no plan, N/A)      Denies  4. RISK OF HARM - HOMICIDAL IDEATION:  \"Do you ever have thoughts of hurting or killing someone else?\"  (e.g., yes, no, no but preoccupation with thoughts about death)    - INTENT:  \"Do you have thoughts of hurting or killing someone right NOW?\" (e.g., yes, no, N/A)    - PLAN: \"Do you have a specific plan for how you would do this?\" (e.g., gun, knife, no plan, N/A)       Denies  5. FUNCTIONAL IMPAIRMENT: \"How have things been going for you overall? Have you had more difficulty than usual doing your normal daily activities?\"  (e.g., better, same, worse; self-care, school, work, interactions)      Mixture of being ok, and then having hard time  6. SUPPORT: \"Who is with you now?\" \"Who do you live with?\" \"Do you have family or friends who you can talk to?\"       Family  7. THERAPIST: \"Do you have a counselor or therapist? Name?\"      Denies  8. STRESSORS: \"Has there been any new stress or recent changes in your life?\"      Going back to work, mother left back,   9. ALCOHOL USE OR SUBSTANCE USE (DRUG USE): \"Do you drink alcohol or use any illegal drugs?\"       Denies  10. OTHER: \"Do you have any other physical symptoms right now?\" (e.g., fever)        Denies  11. DELIVERY DATE: \"When was your delivery date?\"        February    Protocols used: Postpartum - Depression-ADULT-[place]    "

## 2024-06-11 NOTE — PROGRESS NOTES
Assessment & Plan   Diagnoses and all orders for this visit:    Post partum depression  -     sertraline (Zoloft) 25 mg tablet; Take 1 tablet (25 mg total) by mouth daily    Other orders  -     Prenatal MV-Min-Fe Fum-FA-DHA (PRENATAL 1 PO); Take by mouth      Suicide Risk Assessment: denies suicidal ideation  Start medication, Zoloft sent to pharmacy.   Refer to [facility] for counseling, pt declines  Return to office in 4-6 weeks.   Encouraged patient to follow up with lactation as significant stress seems to be coming from being able to feed baby and baby not taking bottle. Discussed that her stress may affect her milk supply as well.           Subjective     Mariela Mtz is an 28 y.o. female who presents for evaluation and treatment of symptoms of post partum depression. She delivered her child by Spontaneous vaginal delivery 4 months ago.      Onset approximately 2 weeks ago, gradually worsening since that time.   Current symptoms include depressed mood, difficulty concentrating, and crying spells/mood swings.   Current treatment for depression:None  Sleep problems: Mild    Early awakening:Absent    Energy: Limited  Motivation: Limited  Concentration: Limited  Rumination/worrying: Marked  Memory: Good  Tearfulness: Marked   Anxiety: Moderate   Panic: Absent   Overall Mood: Moderately worse   Hopelessness: Mild  Suicidal ideation: Absent     Other/Psychosocial Stressors: recent childbirth, milk supply issues/baby not taking bottle.   Family history positive for depression in the patient's mother.   Previous treatment modalities employed include Individual therapy.       Review of Systems  Review of Systems  sleep: normal   appetite changes: slight decrease  weight changes: doesn't keep track  energy/anergy: slightly depressed  interest/pleasure/anhedonia: no  somatic symptoms: no  anxiety/panic: yes  guilty/hopeless: yes  S.I.B.s/risky behavior: no  any drugs: no  alcohol: no        /86 (BP Location:  "Left arm, Patient Position: Sitting, Cuff Size: Standard)   Ht 5' 7\" (1.702 m)   Wt 67.6 kg (149 lb)   Breastfeeding Yes   BMI 23.34 kg/m²     Mental Status Examination:  Posture and motor behavior: Appropriate  Dress, grooming, personal hygiene: Appropriate  Facial expression: Appropriate  Speech: Appropriate  Mood: Appropriate  Coherency and relevance of thought: Appropriate  Thought content: Appropriate  Perceptions: Appropriate  Orientation:Appropriate  Attention and concentration: Appropriate  Memory: : Appropriate  Judgment: Appropriate  Tearful at times.     Physical Exam  Constitutional:       General: She is not in acute distress.     Appearance: Normal appearance. She is well-developed. She is not ill-appearing.   Pulmonary:      Effort: Pulmonary effort is normal. No respiratory distress.   Neurological:      General: No focal deficit present.      Mental Status: She is alert and oriented to person, place, and time.   Psychiatric:         Mood and Affect: Mood normal.         Behavior: Behavior normal.         Thought Content: Thought content normal.         Judgment: Judgment normal.   Vitals and nursing note reviewed.                   "

## 2024-07-11 ENCOUNTER — PREP FOR PROCEDURE (OUTPATIENT)
Dept: INTERVENTIONAL RADIOLOGY/VASCULAR | Facility: CLINIC | Age: 29
End: 2024-07-11

## 2024-07-11 ENCOUNTER — OFFICE VISIT (OUTPATIENT)
Dept: NEPHROLOGY | Facility: CLINIC | Age: 29
End: 2024-07-11
Payer: COMMERCIAL

## 2024-07-11 DIAGNOSIS — R80.1 PERSISTENT PROTEINURIA: ICD-10-CM

## 2024-07-11 DIAGNOSIS — R80.9 PROTEINURIA, UNSPECIFIED TYPE: Primary | ICD-10-CM

## 2024-07-11 DIAGNOSIS — O12.12 PROTEINURIA AFFECTING PREGNANCY IN SECOND TRIMESTER: Primary | ICD-10-CM

## 2024-07-11 PROCEDURE — 99212 OFFICE O/P EST SF 10 MIN: CPT | Performed by: STUDENT IN AN ORGANIZED HEALTH CARE EDUCATION/TRAINING PROGRAM

## 2024-07-11 RX ORDER — METHYLPREDNISOLONE 4 MG/1
TABLET ORAL
COMMUNITY
Start: 2024-07-03

## 2024-07-11 NOTE — PATIENT INSTRUCTIONS
Thank you for coming to your visit today. As we discussed you kidney function is normal, your creatinine is 0.64 mg/dL.  Your electrolytes are normal.  You have proteins in the urine.  Please follow the recommendations below       Recommend low sodium (salt) food    Avoid nonsteroidal anti-inflammatory drugs such as Naprosyn, ibuprofen, Aleve, Advil, Celebrex, Meloxicam (Mobic) etc.  You can use Tylenol as needed if you do not have any liver condition to be concerned about    Try to avoid medications such as pantoprazole or  Protonix/Nexium or Esomeprazole)/Prilosec or omeprazole/Prevacid or lansoprazole/AcipHex or Rabeprazole.  If you are able to, use Pepcid as this is safer for your kidneys.    Try to exercise at least 30 minutes 3 days a week to begin with with an ultimate goal of 5 days a week for at least 30 minutes    Next Visit in 3-4 months with results   If you need to see us earlier we can change the appointment for you      Joselyn Reyes Bahamonde, MD  Nephrology Attending

## 2024-07-11 NOTE — PROGRESS NOTES
NEPHROLOGY OUTPATIENT PROGRESS NOTE   Mariela Mtz 28 y.o. female MRN: [id number]  DATE: 7/11/2024    Reason for visit: No chief complaint on file.       There are no Patient Instructions on file for this visit.      There are no diagnoses linked to this encounter.    Assessment/Plan:  28 y.o. woman with PMH of preeclampsia with her first pregnancy with proteinuria never check in between pregnancies.  Just had a baby 3 months ago she did not develop her eclampsia but developed lower extremity edema.  With proteinuria of 450 mg in 24 hours who presents for follow up of  proteinuria after pregnancy     PLAN:     #Volume status/hypertension:  Volume: Euvolemic on exam   Blood pressure: hypertensive in the office , normal at home , goal less than 140/90  Recommend:  Low-sodium diet  Not taking BP meds at this time  After preeclampsia she required BP meds for a few weeks then resolved  Risk of developing hypertension due to history of preeclampsia  Plan to start Lisinopril for proteinuria and HTN     # Nonnephrotic proteinuria  Proteinuria with first pregnancy in the settings of preeclampsia  No follow-up of proteinuria after delivery  Patient became pregnant and with no proteinuria levels.  Proteinuria was checked during pregnancy and showed 450 mg in 24 hours  UACr 565mg/g  UPCr 0.8g/g  Patient agree with kidney biopsy   Etiology: Possible secondary to TMA in the settings of preeclampsia with chronic changes versus membranous       # History of preeclampsia  And risk of developing hypertension as above  Proteinuria possible TMA with chronic changes  Biopsy as above     # Pregnancy counseling  We discussed future pregnancies  Patient aware of risk of preeclampsia, low birthweight, premature delivery in the settings of proteinuria  Advised patient to control proteinuria better before planning a third pregnancy       SUBJECTIVE / INTERVAL HISTORY:  28 y.o. female presents in follow up of proteinuria. .Feels well, no SOB,  no CP, no recent hospitalizations. No issues with medication       Mariela Mtz denies any recent illness/hospitalizations/medication changes since last office visit.    Review of Systems   Constitutional:  Negative for activity change and appetite change.   HENT:  Negative for congestion and dental problem.    Eyes:  Negative for discharge.   Respiratory:  Negative for shortness of breath.    Gastrointestinal:  Negative for abdominal distention and abdominal pain.   Genitourinary:  Negative for dysuria.   Musculoskeletal:  Negative for arthralgias.   Skin:  Negative for color change.   Neurological:  Negative for dizziness.   Psychiatric/Behavioral:  Negative for agitation.        OBJECTIVE:  There were no vitals taken for this visit. There is no height or weight on file to calculate BMI.    Physical exam:  Physical Exam  General:  no acute distress at this time  Skin:  No acute rash  Eyes:  No scleral icterus and noninjected  ENT:  mucous membranes moist  Neck:  no carotid bruits  Chest:  Clear to auscultation percussion, good respiratory effort, no use of accessory respiratory muscles  CVS:  Regular rate and rhythm without rub   Abdomen:  soft and nontender   Extremities: no significant lower extremity edema  Neuro:  No gross focality  Psych:  Alert , cooperative     Medications:    Current Outpatient Medications:     Prenatal MV-Min-Fe Fum-FA-DHA (PRENATAL 1 PO), Take by mouth, Disp: , Rfl:     sertraline (Zoloft) 25 mg tablet, Take 1 tablet (25 mg total) by mouth daily, Disp: 30 tablet, Rfl: 3    Allergies:  Allergies as of 07/11/2024 - Reviewed 06/11/2024   Allergen Reaction Noted    Cat hair extract Cough, Hives, Itching, Shortness Of Breath, and Wheezing 07/25/2023       The following portions of the patient's history were reviewed and updated as appropriate: past family history, past surgical history and problem list.    Laboratory Results:  Lab Results   Component Value Date    SODIUM 139 05/08/2024    K  "4.6 05/08/2024     05/08/2024    CO2 29 05/08/2024    BUN 21 05/08/2024    CREATININE 0.64 05/08/2024    GLUC 91 05/08/2024    CALCIUM 9.5 05/08/2024        Lab Results   Component Value Date    CALCIUM 9.5 05/08/2024       Portions of the record may have been created with voice recognition software.  Occasional wrong word or \"sound a like\" substitutions may have occurred due to the inherent limitations of voice recognition software.  Read the chart carefully and recognize, using context, where substitutions have occurred.    "

## 2024-07-15 VITALS — WEIGHT: 142 LBS | BODY MASS INDEX: 22.29 KG/M2 | HEIGHT: 67 IN

## 2024-07-15 PROBLEM — R80.1 PERSISTENT PROTEINURIA: Status: ACTIVE | Noted: 2023-10-05

## 2024-07-19 ENCOUNTER — TELEPHONE (OUTPATIENT)
Age: 29
End: 2024-07-19

## 2024-07-19 NOTE — TELEPHONE ENCOUNTER
Patient had called she had a visit scheduled for today for a med check with Dr. Holguin and was unable to make it. Patient wanted to report that the medication was working well and she has not had to increase the does at all. Patient was unsure if she still needed to be seen for this, she can be reached at [phone].

## 2024-07-20 ENCOUNTER — APPOINTMENT (OUTPATIENT)
Dept: LAB | Facility: CLINIC | Age: 29
End: 2024-07-20
Payer: COMMERCIAL

## 2024-07-20 DIAGNOSIS — R80.1 PERSISTENT PROTEINURIA: ICD-10-CM

## 2024-07-20 DIAGNOSIS — O12.12 PROTEINURIA AFFECTING PREGNANCY IN SECOND TRIMESTER: ICD-10-CM

## 2024-07-20 LAB
ALBUMIN SERPL BCG-MCNC: 4.5 G/DL (ref 3.5–5)
ALP SERPL-CCNC: 72 U/L (ref 34–104)
ALT SERPL W P-5'-P-CCNC: 18 U/L (ref 7–52)
ANA SER QL IA: NEGATIVE
ANION GAP SERPL CALCULATED.3IONS-SCNC: 6 MMOL/L (ref 4–13)
APTT PPP: 33 SECONDS (ref 23–37)
AST SERPL W P-5'-P-CCNC: 19 U/L (ref 13–39)
BACTERIA UR QL AUTO: ABNORMAL /HPF
BILIRUB SERPL-MCNC: 0.57 MG/DL (ref 0.2–1)
BILIRUB UR QL STRIP: NEGATIVE
BUN SERPL-MCNC: 19 MG/DL (ref 5–25)
C3 SERPL-MCNC: 115 MG/DL (ref 87–200)
C4 SERPL-MCNC: 36 MG/DL (ref 19–52)
CALCIUM SERPL-MCNC: 9.5 MG/DL (ref 8.4–10.2)
CHLORIDE SERPL-SCNC: 103 MMOL/L (ref 96–108)
CLARITY UR: CLEAR
CO2 SERPL-SCNC: 29 MMOL/L (ref 21–32)
COLOR UR: COLORLESS
CREAT SERPL-MCNC: 0.68 MG/DL (ref 0.6–1.3)
CREAT UR-MCNC: 21 MG/DL
GFR SERPL CREATININE-BSD FRML MDRD: 117 ML/MIN/1.73SQ M
GLUCOSE SERPL-MCNC: 70 MG/DL (ref 65–140)
GLUCOSE UR STRIP-MCNC: NEGATIVE MG/DL
HGB UR QL STRIP.AUTO: ABNORMAL
INR PPP: 0.9 (ref 0.84–1.19)
KETONES UR STRIP-MCNC: NEGATIVE MG/DL
LEUKOCYTE ESTERASE UR QL STRIP: NEGATIVE
MICROALBUMIN UR-MCNC: 75.9 MG/L
MICROALBUMIN/CREAT 24H UR: 361 MG/G CREATININE (ref 0–30)
NITRITE UR QL STRIP: NEGATIVE
NON-SQ EPI CELLS URNS QL MICRO: ABNORMAL /HPF
PH UR STRIP.AUTO: 7 [PH]
POTASSIUM SERPL-SCNC: 4.3 MMOL/L (ref 3.5–5.3)
PROT SERPL-MCNC: 7.1 G/DL (ref 6.4–8.4)
PROT UR STRIP-MCNC: NEGATIVE MG/DL
PROTHROMBIN TIME: 12.7 SECONDS (ref 11.6–14.5)
RBC #/AREA URNS AUTO: ABNORMAL /HPF
SODIUM SERPL-SCNC: 138 MMOL/L (ref 135–147)
SP GR UR STRIP.AUTO: 1.01 (ref 1–1.03)
UROBILINOGEN UR STRIP-ACNC: <2 MG/DL
WBC #/AREA URNS AUTO: ABNORMAL /HPF

## 2024-07-20 PROCEDURE — 81001 URINALYSIS AUTO W/SCOPE: CPT

## 2024-07-20 PROCEDURE — 86160 COMPLEMENT ANTIGEN: CPT

## 2024-07-20 PROCEDURE — 86038 ANTINUCLEAR ANTIBODIES: CPT

## 2024-07-20 PROCEDURE — 80053 COMPREHEN METABOLIC PANEL: CPT

## 2024-07-20 PROCEDURE — 36415 COLL VENOUS BLD VENIPUNCTURE: CPT

## 2024-07-20 PROCEDURE — 83516 IMMUNOASSAY NONANTIBODY: CPT

## 2024-07-20 PROCEDURE — 85610 PROTHROMBIN TIME: CPT

## 2024-07-20 PROCEDURE — 82570 ASSAY OF URINE CREATININE: CPT

## 2024-07-20 PROCEDURE — 85730 THROMBOPLASTIN TIME PARTIAL: CPT

## 2024-07-20 PROCEDURE — 86162 COMPLEMENT TOTAL (CH50): CPT

## 2024-07-20 PROCEDURE — 82043 UR ALBUMIN QUANTITATIVE: CPT

## 2024-07-22 LAB
CH50 SERPL-ACNC: 52 U/ML
PLA2R IGG SER IA-ACNC: <1.8 RU/ML (ref 0–19.9)

## 2024-08-13 ENCOUNTER — HOSPITAL ENCOUNTER (OUTPATIENT)
Dept: RADIOLOGY | Facility: HOSPITAL | Age: 29
Discharge: HOME | End: 2024-08-13
Attending: RADIOLOGY
Payer: COMMERCIAL

## 2024-08-13 VITALS
RESPIRATION RATE: 18 BRPM | TEMPERATURE: 97.1 F | OXYGEN SATURATION: 98 % | HEART RATE: 63 BPM | SYSTOLIC BLOOD PRESSURE: 110 MMHG | DIASTOLIC BLOOD PRESSURE: 66 MMHG

## 2024-08-13 DIAGNOSIS — R80.9 PROTEINURIA, UNSPECIFIED TYPE: ICD-10-CM

## 2024-08-13 LAB
ERYTHROCYTE [DISTWIDTH] IN BLOOD BY AUTOMATED COUNT: 12.9 % (ref 11.6–15.1)
EXT PREGNANCY TEST URINE: NEGATIVE
EXT. CONTROL: NORMAL
HCT VFR BLD AUTO: 40.3 % (ref 34.8–46.1)
HGB BLD-MCNC: 13.6 G/DL (ref 11.5–15.4)
INR PPP: 0.89 (ref 0.85–1.19)
MCH RBC QN AUTO: 30 PG (ref 26.8–34.3)
MCHC RBC AUTO-ENTMCNC: 33.7 G/DL (ref 31.4–37.4)
MCV RBC AUTO: 89 FL (ref 82–98)
PLATELET # BLD AUTO: 295 THOUSANDS/UL (ref 149–390)
PMV BLD AUTO: 9.5 FL (ref 8.9–12.7)
PROTHROMBIN TIME: 12.8 SECONDS (ref 12.3–15)
RBC # BLD AUTO: 4.54 MILLION/UL (ref 3.81–5.12)
WBC # BLD AUTO: 5.73 THOUSAND/UL (ref 4.31–10.16)

## 2024-08-13 PROCEDURE — 50200 RENAL BIOPSY PERQ: CPT

## 2024-08-13 PROCEDURE — 88348 ELECTRON MICROSCOPY DX: CPT | Performed by: STUDENT IN AN ORGANIZED HEALTH CARE EDUCATION/TRAINING PROGRAM

## 2024-08-13 PROCEDURE — 88350 IMFLUOR EA ADDL 1ANTB STN PX: CPT | Performed by: STUDENT IN AN ORGANIZED HEALTH CARE EDUCATION/TRAINING PROGRAM

## 2024-08-13 PROCEDURE — 88300 SURGICAL PATH GROSS: CPT | Performed by: STUDENT IN AN ORGANIZED HEALTH CARE EDUCATION/TRAINING PROGRAM

## 2024-08-13 PROCEDURE — 85610 PROTHROMBIN TIME: CPT | Performed by: RADIOLOGY

## 2024-08-13 PROCEDURE — 99153 MOD SED SAME PHYS/QHP EA: CPT

## 2024-08-13 PROCEDURE — 88329 PATH CONSLTJ DRG SURG: CPT | Performed by: STUDENT IN AN ORGANIZED HEALTH CARE EDUCATION/TRAINING PROGRAM

## 2024-08-13 PROCEDURE — 76942 ECHO GUIDE FOR BIOPSY: CPT

## 2024-08-13 PROCEDURE — 88305 TISSUE EXAM BY PATHOLOGIST: CPT | Performed by: STUDENT IN AN ORGANIZED HEALTH CARE EDUCATION/TRAINING PROGRAM

## 2024-08-13 PROCEDURE — 85027 COMPLETE CBC AUTOMATED: CPT | Performed by: RADIOLOGY

## 2024-08-13 PROCEDURE — 88346 IMFLUOR 1ST 1ANTB STAIN PX: CPT | Performed by: STUDENT IN AN ORGANIZED HEALTH CARE EDUCATION/TRAINING PROGRAM

## 2024-08-13 PROCEDURE — 88313 SPECIAL STAINS GROUP 2: CPT | Performed by: STUDENT IN AN ORGANIZED HEALTH CARE EDUCATION/TRAINING PROGRAM

## 2024-08-13 PROCEDURE — 81025 URINE PREGNANCY TEST: CPT | Performed by: STUDENT IN AN ORGANIZED HEALTH CARE EDUCATION/TRAINING PROGRAM

## 2024-08-13 PROCEDURE — 99152 MOD SED SAME PHYS/QHP 5/>YRS: CPT

## 2024-08-13 RX ORDER — OXYCODONE HYDROCHLORIDE 5 MG/1
5 TABLET ORAL ONCE AS NEEDED
Status: DISCONTINUED | OUTPATIENT
Start: 2024-08-13 | End: 2024-08-14 | Stop reason: HOSPADM

## 2024-08-13 RX ORDER — MIDAZOLAM HYDROCHLORIDE 2 MG/2ML
INJECTION, SOLUTION INTRAMUSCULAR; INTRAVENOUS AS NEEDED
Status: COMPLETED | OUTPATIENT
Start: 2024-08-13 | End: 2024-08-13

## 2024-08-13 RX ORDER — LIDOCAINE WITH 8.4% SOD BICARB 0.9%(10ML)
SYRINGE (ML) INJECTION AS NEEDED
Status: COMPLETED | OUTPATIENT
Start: 2024-08-13 | End: 2024-08-13

## 2024-08-13 RX ORDER — ONDANSETRON 2 MG/ML
4 INJECTION INTRAMUSCULAR; INTRAVENOUS EVERY 6 HOURS PRN
Status: DISCONTINUED | OUTPATIENT
Start: 2024-08-13 | End: 2024-08-14 | Stop reason: HOSPADM

## 2024-08-13 RX ORDER — FENTANYL CITRATE 50 UG/ML
INJECTION, SOLUTION INTRAMUSCULAR; INTRAVENOUS AS NEEDED
Status: COMPLETED | OUTPATIENT
Start: 2024-08-13 | End: 2024-08-13

## 2024-08-13 RX ADMIN — MIDAZOLAM 1 MG: 1 INJECTION INTRAMUSCULAR; INTRAVENOUS at 09:53

## 2024-08-13 RX ADMIN — Medication 10 ML: at 10:05

## 2024-08-13 RX ADMIN — MIDAZOLAM 1 MG: 1 INJECTION INTRAMUSCULAR; INTRAVENOUS at 10:04

## 2024-08-13 RX ADMIN — FENTANYL CITRATE 50 MCG: 50 INJECTION INTRAMUSCULAR; INTRAVENOUS at 10:04

## 2024-08-13 RX ADMIN — FENTANYL CITRATE 50 MCG: 50 INJECTION INTRAMUSCULAR; INTRAVENOUS at 09:53

## 2024-08-13 NOTE — SEDATION DOCUMENTATION
Procedure completed by Dr Kam. Pt tolerated without issues, VSS. Education provided to pt prior to and throughout procedure, questions answered as offered. Band-aid to site. Transported to GI Pre for recovery, bedside report given.

## 2024-08-13 NOTE — LETTER
[facility]  [address]  [place] [address]  Dept: [phone]    August 13, 2024     Patient: Mariela Mtz   YOB: 1995   Date of Visit: 8/13/2024       To Whom it May Concern:    Mariela Mtz is under my professional care. She was seen in the hospital from 8/13/2024 to 08/13/24. She {Return to school/sport/work:34434}.    If you have any questions or concerns, please don't hesitate to call.         Sincerely,          Vangie Brian RN

## 2024-08-13 NOTE — LETTER
[facility]  [address]  [place] [address]  Dept: [phone]    August 13, 2024     Patient: Mariela Mtz   YOB: 1995   Date of Visit: 8/13/2024       To Whom it May Concern:    Mariela Mtz is under my professional care. She was seen in the hospital from 8/13/2024 to 08/13/24. She may return to work on 8/15/2024 without limitations.    If you have any questions or concerns, please don't hesitate to call.         Sincerely,          Laura Kam MD

## 2024-08-13 NOTE — DISCHARGE INSTRUCTIONS
Percutaneous Kidney Biopsy   WHAT YOU NEED TO KNOW:   A percutaneous kidney biopsy is a procedure to remove a small sample of kidney tissue. It may also be done to check for kidney disease or cancer.     DISCHARGE INSTRUCTIONS:   Follow up with your healthcare provider as directed:  Write down your questions so you remember to ask them during your visits.   Wound care:  The Band-Aid may be removed in 24 hours.                                                                                                For more information:   National Kidney and Urologic Diseases Information Clearinghouse  [address] [place] [address]  Phone: 5- 101 - [phone]  Web Address: http://kidney.niddk.nih.gov/   Care after your procedure:    1. Limit your activities for 36 hours after your biopsy.    2. No driving day of biopsy.    3. Return to your normal diet. Flat sips of flat soda helps with mild nausea.    4. Remove band-aid or dressing 24 hours after procedure.       Contact Interventional Radiology at [phone]    ([facility] PATIENTS: Contact Interventional Radiology at [phone]) ([facility] PATIENTS: Contact Interventional Radiology at [phone]) if:    1. Difficulty breathing, nausea or vomiting.    2  You feel weak or dizzy.    3. Chills or fever above 101 degrees F.     You have persistent nausea or vomiting    4. You have severe pain in your abdomen or where You feel weak or dizzy.your           procedure was done.    5  You have blood in your urine. You urinate small amounts or not at all.    4. Develop any redness, swelling, heat, unusual drainage, heavy bruising or bleeding     from biopsy site.       Procedural Sedation   WHAT YOU NEED TO KNOW:   Procedural sedation is medicine used during procedures to help you feel relaxed and calm. You will remember little to none of the procedure. After sedation you may feel tired, weak, or unsteady on your feet. You may also have trouble concentrating or short-term  memory loss. These symptoms should go away in 24 hours or less.   DISCHARGE INSTRUCTIONS:   Call 911 or have someone else call for any of the following:   You have sudden trouble breathing.     You cannot be woken.     Contact Interventional Radiology at [phone]   [facility] PATIENTS: Contact Interventional Radiology at [phone] [facility] PATIENTS: Contact Interventional Radiology at [phone]) if any of the following occur:      You have a severe headache or dizziness.     Your heart is beating faster than usual.    You have a fever or chills.     Your skin is itchy, swollen, or you have a rash.     You have nausea or are vomiting for more than 8 hours after the procedure.      You have questions or concerns about your condition or care.  Self-care:   Have someone stay with you for 24 hours. This person can drive you to errands and help you do things around the house. This person can also watch for problems.      Rest and do quiet activities for 24 hours. Do not exercise, ride a bike, or play sports. Stand up slowly to prevent dizziness and falls. Take short walks around the house with another person. Slowly return to your usual activities the next day.      Do not drive or use dangerous machines or tools for 24 hours. You may injure yourself or others. Examples include a lawnmower, saw, or drill. Do not return to work for 24 hours if you use dangerous machines or tools for work.      Do not make important decisions for 24 hours. For example, do not sign important papers or invest money.      Drink liquids as directed. Liquids help flush the sedation medicine out of your body. Ask how much liquid to drink each day and which liquids are best for you.      Eat small, frequent meals to prevent nausea and vomiting. Start with clear liquids such as juice or broth. If you do not vomit after clear liquids, you can eat your usual foods.      Do not drink alcohol or take medicines that make you drowsy. This includes  medicines that help you sleep and anxiety medicines. Ask your healthcare provider if it is safe for you to take pain medicine.  Follow up with your healthcare provider as directed: Write down your questions so you remember to ask them during your visits.

## 2024-08-13 NOTE — BRIEF OP NOTE (RAD/CATH)
INTERVENTIONAL RADIOLOGY PROCEDURE NOTE    Date: 8/13/2024    Procedure:   Procedure Summary       Date: 08/13/24 Room / Location: [facility] Interventional Radiology    Anesthesia Start:  Anesthesia Stop:     Procedure: IR BIOPSY KIDNEY RANDOM Diagnosis:       Proteinuria, unspecified type      (proteinuria)    Scheduled Providers:  Responsible Provider:     Anesthesia Type: Not recorded ASA Status: Not recorded            Preoperative diagnosis:   1. Proteinuria, unspecified type         Postoperative diagnosis: Same.    Surgeon: Laura Kam MD     Assistant: None. No qualified resident was available.    Blood loss: Minimal    Specimens: 18-gauge core x 3    Findings: Successful random kidney biopsy (left kidney)    Complications: None immediate.    Anesthesia: conscious sedation

## 2024-08-15 ENCOUNTER — TELEPHONE (OUTPATIENT)
Dept: NEPHROLOGY | Facility: HOSPITAL | Age: 29
End: 2024-08-15

## 2024-08-15 ENCOUNTER — TELEPHONE (OUTPATIENT)
Dept: OTHER | Facility: HOSPITAL | Age: 29
End: 2024-08-15

## 2024-08-15 DIAGNOSIS — N02.B9 IGA NEPHROPATHY: Primary | ICD-10-CM

## 2024-08-15 LAB — SCAN RESULT: NORMAL

## 2024-08-15 RX ORDER — LISINOPRIL 5 MG/1
2.5 TABLET ORAL DAILY
Qty: 90 TABLET | Refills: 1 | Status: SHIPPED | OUTPATIENT
Start: 2024-08-15

## 2024-08-15 NOTE — TELEPHONE ENCOUNTER
----- Message from Joselyn Reyes Bahamonde, MD sent at 8/15/2024 11:45 AM EDT -----  Please reschedule her appointment with me.  Thank you

## 2024-08-15 NOTE — TELEPHONE ENCOUNTER
With patient and informed about kidney biopsy results.  Biopsy showed IgA nephropathy.  Advised to start lisinopril.  Lantus started 2.5 mg due to low blood pressure.  Patient is currently breast-feeding and I informed that there is a very low chance that it can affect the baby through breastmilk.      Joselyn Reyes Bahamonde, MD  Nephrology Attending

## 2024-08-28 ENCOUNTER — OFFICE VISIT (OUTPATIENT)
Dept: OBGYN CLINIC | Facility: MEDICAL CENTER | Age: 29
End: 2024-08-28
Payer: COMMERCIAL

## 2024-08-28 VITALS
BODY MASS INDEX: 21.97 KG/M2 | HEART RATE: 62 BPM | WEIGHT: 140 LBS | SYSTOLIC BLOOD PRESSURE: 118 MMHG | HEIGHT: 67 IN | DIASTOLIC BLOOD PRESSURE: 79 MMHG

## 2024-08-28 DIAGNOSIS — M76.52 PATELLAR TENDINITIS OF LEFT KNEE: ICD-10-CM

## 2024-08-28 DIAGNOSIS — M71.22 BAKER'S CYST OF KNEE, LEFT: Primary | ICD-10-CM

## 2024-08-28 PROCEDURE — 99203 OFFICE O/P NEW LOW 30 MIN: CPT | Performed by: ORTHOPAEDIC SURGERY

## 2024-08-28 NOTE — PROGRESS NOTES
Ortho Sports Medicine Knee New Patient Visit     Assesment:   29 y.o. female left knee patellar tendinitis and possible baker's cyst    Plan:  Referral to formal physical therapy was given  Recommends going for a few appointments to get a stretching and strengthening program of the LE  Can continue to ride stationary bike, run and play soccer. Let pain be your guidance   Consider future imaging such as an Mri if pain and swelling persists  F/u as needed    Conservative treatment:    Ice to knee for 20 minutes at least 1-2 times daily.  PT for ROM/strengthening to knee, hip and core.    Imaging:    No imaging was available for review today.      Injection:    No Injection planned at this time.      Surgery:     No surgery is recommended at this point, continue with conservative treatment plan as noted.      Follow up:    Return if symptoms worsen or fail to improve.        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      Pain is located anterior, posterior.  The patient rates the pain as a 0/10.  The pain has been present for 2 weeks.      The patient sustained an injury about 2 weeks ago.  The mechanism of injury was overuse. The pain is characterized as no pain at the moment.  The pain is present at all times when she was having pain.      Pain is improved by rest, ice, and NSAIDS.  Pain is aggravated by weight bearing, running, and walking.    Symptoms include popping and swelling.     The patient has tried rest, ice, and NSAIDS.          Knee Surgical History:  None    Past Medical, Social and Family History:  Past Medical History:   Diagnosis Date    Asthma     exercise induced    Preeclampsia     Varicella     childhood     Past Surgical History:   Procedure Laterality Date    IR BIOPSY  KIDNEY RANDOM  8/13/2024    WISDOM TOOTH EXTRACTION       Allergies   Allergen Reactions    Cat Hair Extract Cough, Hives, Itching, Shortness Of Breath and Wheezing     Current Outpatient Medications on File Prior to Visit   Medication Sig Dispense Refill    lisinopril (ZESTRIL) 5 mg tablet Take 0.5 tablets (2.5 mg total) by mouth daily 90 tablet 1    Prenatal MV-Min-Fe Fum-FA-DHA (PRENATAL 1 PO) Take by mouth      sertraline (Zoloft) 25 mg tablet Take 1 tablet (25 mg total) by mouth daily 30 tablet 3     No current facility-administered medications on file prior to visit.     Social History     Socioeconomic History    Marital status: /Civil Union     Spouse name: Not on file    Number of children: Not on file    Years of education: Not on file    Highest education level: Not on file   Occupational History    Not on file   Tobacco Use    Smoking status: Never     Passive exposure: Never    Smokeless tobacco: Never   Vaping Use    Vaping status: Never Used   Substance and Sexual Activity    Alcohol use: Never    Drug use: Never    Sexual activity: Yes     Partners: Male     Birth control/protection: None   Other Topics Concern    Not on file   Social History Narrative    Not on file     Social Determinants of Health     Financial Resource Strain: Not on file   Food Insecurity: No Food Insecurity (2/16/2024)    Hunger Vital Sign     Worried About Running Out of Food in the Last Year: Never true     Ran Out of Food in the Last Year: Never true   Transportation Needs: No Transportation Needs (2/16/2024)    PRAPARE - Transportation     Lack of Transportation (Medical): No     Lack of Transportation (Non-Medical): No   Physical Activity: Not on file   Stress: Not on file   Social Connections: Not on file   Intimate Partner Violence: Not on file   Housing Stability: High Risk (2/16/2024)    Housing Stability Vital Sign     Unable to Pay for Housing in the Last Year: No     Number of Times Moved in the Last Year: 2  "    Homeless in the Last Year: No         I have reviewed the past medical, surgical, social and family history, medications and allergies as documented in the EMR.    Review of systems: ROS is negative other than that noted in the HPI.  Constitutional: Negative for fatigue and fever.   HENT: Negative for sore throat.    Respiratory: Negative for shortness of breath.    Cardiovascular: Negative for chest pain.   Gastrointestinal: Negative for abdominal pain.   Endocrine: Negative for cold intolerance and heat intolerance.   Genitourinary: Negative for flank pain.   Musculoskeletal: Negative for back pain.   Skin: Negative for rash.   Allergic/Immunologic: Negative for immunocompromised state.   Neurological: Negative for dizziness.   Psychiatric/Behavioral: Negative for agitation.      Physical Exam:    Blood pressure 118/79, pulse 62, height 5' 7\" (1.702 m), weight 63.5 kg (140 lb), currently breastfeeding.    General/Constitutional: NAD, well developed, well nourished  HENT: Normocephalic, atraumatic  CV: Intact distal pulses, regular rate  Resp: No respiratory distress or labored breathing  GI: Soft and non-tender   Lymphatic: No lymphadenopathy palpated  Neuro: Alert and Oriented x 3, no focal deficits  Psych: Normal mood, normal affect, normal judgement, normal behavior  Skin: Warm, dry, no rashes, no erythema      Knee Exam (focused):                RIGHT LEFT   ROM:   0-130 0-130   Palpation: Effusion negative minimal     MJL tenderness Negative Negative     LJL tenderness Negative Negative   Meniscus: Mele Negative Negative    Apley's Compression Negative Negative   Instability: Varus stable stable     Valgus stable stable   Special Tests: Lachman Negative Negative     Posterior drawer Negative Negative     Anterior drawer Negative Negative     Pivot shift not tested not tested     Dial not tested not tested   Patella: Palpation no tenderness no tenderness     Mobility 1/4 1/4     Apprehension Negative " Negative   Other: Single leg 1/4 squat not tested not tested      LE NV Exam: +2 DP/PT pulses bilaterally  Sensation intact to light touch L2-S1 bilaterally     Bilateral hip ROM demonstrates no pain actively or passively    No calf tenderness to palpation bilaterally    Knee Imaging    No imaging today      Scribe Attestation      I,:  Tanner Wells am acting as a scribe while in the presence of the attending physician.:       I,:  Ahsan Moss, DO personally performed the services described in this documentation    as scribed in my presence.:

## 2024-09-24 ENCOUNTER — NURSE TRIAGE (OUTPATIENT)
Age: 29
End: 2024-09-24

## 2024-09-24 DIAGNOSIS — N61.0 MASTITIS: Primary | ICD-10-CM

## 2024-09-24 RX ORDER — CEFUROXIME AXETIL 500 MG/1
500 TABLET ORAL EVERY 12 HOURS SCHEDULED
Qty: 20 TABLET | Refills: 0 | Status: SHIPPED | OUTPATIENT
Start: 2024-09-24 | End: 2024-10-04

## 2024-09-24 NOTE — TELEPHONE ENCOUNTER
"Breastfeeding patient, delivered in February, calling with onset of breast redness yesterday that started on bottom of breast and now spread around to top of breast. Area is warm to touch and she reports 7/10 pain. She tried applying warm compresses, and continues to pump and breastfeed but with no relief. Feels flu-like symptoms including body aches. Unsure if she has a fever, as she has been taking Tylenol and Motrin since yesterday to help with symptoms.     Per protocol should be seen in office today, however, RN unable to find appointments. Advised will discuss with provider on call to see if OK to send antibiotics to pharmacy due to no appointments. Pharmacy verified on file. Pt agreeable to plan. No further questions.     ESC sent to provider on call, Dr. Holguin. Per provider: \"I sent ceftin 500 bid x 10 days. shoud see improvement in 24-48 hours. she should continue motrin as needed and continue to feed/pump per usual schedule.\"     RN placed call to patient with above information. Pt aware to call back if is not feeling or seeing improvement after 48 hours of treatment. No further questions.     Answer Assessment - Initial Assessment Questions  1. PAIN: \"How bad is the pain?\"  (Scale 1-10; or mild, moderate, severe)    - MILD - doesn't interfere with normal activities     - MODERATE - interferes with normal activities or awakens from sleep     - SEVERE - excruciating pain, unable to do any normal activities       7/10  2. SKIN: \"Does the skin appear red?\"        Yes, red blotch  3. LOCATION: \"Which breast?\" (e.g., left, right, both)      Bottom of breast, now top right area of breast  4. DELIVERY: \"When was the baby born?\"       2/2024  5. BREASTFEEDING: \"Have you been breastfeeding?\" If yes, ask: \"When did you stop?\"      Yes  6. ONSET: \"When did the breast pain start?\" (e.g., hours, days)      Yesterday morning  7. FEVER: \"Do you have a fever?\" If Yes, ask: \"What is it, how was it measured, and when did " "it start?\"      Unsure - taking tylenol and motrin  8. OTHER SYMPTOMS: \"Do you have any other symptoms?\" (e.g., abdominal pain, feeling sad or depressed, weakness)      Flu, cold-like symptoms: body aches.    Protocols used: Postpartum - Breast Pain and Engorgement-ADULT-[place]    "

## 2024-09-26 ENCOUNTER — TELEPHONE (OUTPATIENT)
Dept: NEPHROLOGY | Facility: CLINIC | Age: 29
End: 2024-09-26

## 2024-09-26 DIAGNOSIS — O12.12 PROTEINURIA AFFECTING PREGNANCY IN SECOND TRIMESTER: Primary | ICD-10-CM

## 2024-09-26 DIAGNOSIS — R80.1 PERSISTENT PROTEINURIA: ICD-10-CM

## 2024-10-02 ENCOUNTER — TELEPHONE (OUTPATIENT)
Dept: NEPHROLOGY | Facility: CLINIC | Age: 29
End: 2024-10-02

## 2024-10-08 ENCOUNTER — TELEPHONE (OUTPATIENT)
Age: 29
End: 2024-10-08

## 2024-10-08 NOTE — TELEPHONE ENCOUNTER
Patient calling to ask if the upcoming urine tests should be fasting. Informed her they are non fasting.  No further action needed

## 2024-10-09 ENCOUNTER — APPOINTMENT (OUTPATIENT)
Dept: LAB | Facility: CLINIC | Age: 29
End: 2024-10-09
Payer: COMMERCIAL

## 2024-10-09 DIAGNOSIS — R80.1 PERSISTENT PROTEINURIA: ICD-10-CM

## 2024-10-09 DIAGNOSIS — O12.12 PROTEINURIA AFFECTING PREGNANCY IN SECOND TRIMESTER: ICD-10-CM

## 2024-10-09 LAB
BACTERIA UR QL AUTO: ABNORMAL /HPF
BILIRUB UR QL STRIP: NEGATIVE
CLARITY UR: CLEAR
COLOR UR: ABNORMAL
CREAT UR-MCNC: 37 MG/DL
GLUCOSE UR STRIP-MCNC: NEGATIVE MG/DL
HGB UR QL STRIP.AUTO: ABNORMAL
KETONES UR STRIP-MCNC: NEGATIVE MG/DL
LEUKOCYTE ESTERASE UR QL STRIP: NEGATIVE
MICROALBUMIN UR-MCNC: 86.9 MG/L
MICROALBUMIN/CREAT 24H UR: 235 MG/G CREATININE (ref 0–30)
NITRITE UR QL STRIP: NEGATIVE
NON-SQ EPI CELLS URNS QL MICRO: ABNORMAL /HPF
PH UR STRIP.AUTO: 7 [PH]
PROT UR STRIP-MCNC: NEGATIVE MG/DL
RBC #/AREA URNS AUTO: ABNORMAL /HPF
SP GR UR STRIP.AUTO: 1.01 (ref 1–1.03)
UROBILINOGEN UR STRIP-ACNC: <2 MG/DL
WBC #/AREA URNS AUTO: ABNORMAL /HPF

## 2024-10-09 PROCEDURE — 82570 ASSAY OF URINE CREATININE: CPT

## 2024-10-09 PROCEDURE — 81001 URINALYSIS AUTO W/SCOPE: CPT

## 2024-10-09 PROCEDURE — 82043 UR ALBUMIN QUANTITATIVE: CPT

## 2024-10-10 ENCOUNTER — OFFICE VISIT (OUTPATIENT)
Dept: NEPHROLOGY | Facility: CLINIC | Age: 29
End: 2024-10-10
Payer: COMMERCIAL

## 2024-10-10 VITALS
HEART RATE: 63 BPM | DIASTOLIC BLOOD PRESSURE: 72 MMHG | WEIGHT: 142 LBS | SYSTOLIC BLOOD PRESSURE: 118 MMHG | HEIGHT: 67 IN | BODY MASS INDEX: 22.29 KG/M2

## 2024-10-10 DIAGNOSIS — N02.B9 IGA NEPHROPATHY: Primary | ICD-10-CM

## 2024-10-10 DIAGNOSIS — R80.1 PERSISTENT PROTEINURIA: ICD-10-CM

## 2024-10-10 PROCEDURE — 99214 OFFICE O/P EST MOD 30 MIN: CPT | Performed by: STUDENT IN AN ORGANIZED HEALTH CARE EDUCATION/TRAINING PROGRAM

## 2024-10-10 NOTE — PROGRESS NOTES
Ambulatory Glomerular Visit  Name: Mariela Mtz      : 1995      MRN: [id number]  Encounter Provider: Joselyn Reyes Bahamonde, MD  Encounter Date: 10/10/2024   Encounter department: [facility] [place]    Assessment & Plan  IgA nephropathy  Time of diagnosis, 2024  Biopsy sent to [facility]  78 glomeruli  6 with global glomerulosclerosis  Minimal interstitial fibrosis and tubular atrophy  M1 E0 S1 T0C0, mild disease  Continue with lisinopril 2.5 mg, unable to tolerate higher dose due to dizziness and hypotension  Proteinuria to 35 mg/g, trending down  If no improvement of proteinuria or worsening proteinuria we can add SGLT2 inhibitors  Informed of teratogenic effects of Acei/ARB. Advised to stop Lisinopril immediately if pregnant    Orders:    Basic metabolic panel; Future    Urinalysis with microscopic; Future    Albumin / creatinine urine ratio; Future    Hypertension in pregnancy, preeclampsia, delivered  Patient aware of high risk of preeclampsia in next pregnancy or developing chronic hypertension  Recommend low-sodium diet, exercise, healthy diet  Not hypertensive at this time           Persistent proteinuria  UACR trending down to 235 mg/g with lisinopril  Secondary to IgA nephropathy as above  Continue with lisinopril  If worsening proteinuria will plan for SGLT2 inhibitors  Orders:    Albumin / creatinine urine ratio; Future      History of Present Illness     Mariela Mtz is a 29 y.o. female 28 y.o. woman with PMH of preeclampsia with her first pregnancy with proteinuria never check in between pregnancies.  Just had a baby 3 months ago she did not develop her eclampsia but developed lower extremity edema.  With proteinuria of 450 mg.  Patient is here for follow-up of IgA nephropathy        History obtained from : patient  Review of Systems   Constitutional:  Negative for activity change and appetite change.   HENT:  Negative for congestion and dental problem.    Eyes:  " Negative for discharge and itching.   Respiratory:  Negative for choking.    Cardiovascular:  Negative for leg swelling.   Gastrointestinal:  Negative for abdominal pain.   Endocrine: Negative for cold intolerance.   Genitourinary:  Negative for dysuria.   Musculoskeletal:  Negative for arthralgias.   Skin:  Negative for color change and pallor.   Neurological:  Negative for dizziness.   Psychiatric/Behavioral:  Negative for agitation.      Pertinent Medical History        # Nonnephrotic proteinuria  Proteinuria with first pregnancy in the settings of preeclampsia  Secondary to IgAN          # History of preeclampsia  And risk of developing hypertension as above    # Pregnancy counseling  We discussed future pregnancies  Patient aware of risk of preeclampsia, low birthweight, premature delivery in the settings of proteinuria  Advised patient to control proteinuria better before planning a third pregnancy       Medical History Reviewed by provider this encounter:       Current Outpatient Medications on File Prior to Visit   Medication Sig Dispense Refill    lisinopril (ZESTRIL) 5 mg tablet Take 0.5 tablets (2.5 mg total) by mouth daily 90 tablet 1    Prenatal MV-Min-Fe Fum-FA-DHA (PRENATAL 1 PO) Take by mouth      sertraline (Zoloft) 25 mg tablet Take 1 tablet (25 mg total) by mouth daily 30 tablet 3     No current facility-administered medications on file prior to visit.      Social History     Tobacco Use    Smoking status: Never     Passive exposure: Never    Smokeless tobacco: Never   Vaping Use    Vaping status: Never Used   Substance and Sexual Activity    Alcohol use: Never    Drug use: Never    Sexual activity: Yes     Partners: Male     Birth control/protection: None         Objective     /72 (BP Location: Left arm, Patient Position: Sitting, Cuff Size: Adult)   Pulse 63   Ht 5' 7\" (1.702 m)   Wt 64.4 kg (142 lb)   BMI 22.24 kg/m²     Physical Exam  General:  no acute distress at this time  Skin:  " No acute rash  Eyes:  No scleral icterus and noninjected  ENT:  mucous membranes moist  Neck:  no carotid bruits  Chest:  Clear to auscultation percussion, good respiratory effort, no use of accessory respiratory muscles  CVS:  Regular rate and rhythm without rub   Abdomen:  soft and nontender   Extremities: no significant lower extremity edema  Neuro:  No gross focality  Psych:  Alert , cooperative

## 2024-10-10 NOTE — PATIENT INSTRUCTIONS
Thank you for coming to your visit today. As we discussed you kidney function is normal and the proteins in your urine it is slightly better. Please follow the recommendations below       Recommend low sodium (salt) food    Avoid nonsteroidal anti-inflammatory drugs such as Naprosyn, ibuprofen, Aleve, Advil, Celebrex, Meloxicam (Mobic) etc.  You can use Tylenol as needed if you do not have any liver condition to be concerned about      Next Visit in 6 months with results   If you need to see us earlier we can change the appointment for you      Joselyn Reyes Bahamonde, MD  Nephrology Attending

## 2024-10-11 RX ORDER — SERTRALINE HYDROCHLORIDE 25 MG/1
25 TABLET, FILM COATED ORAL DAILY
Qty: 30 TABLET | Refills: 3 | Status: SHIPPED | OUTPATIENT
Start: 2024-10-11

## 2024-10-13 PROBLEM — N02.B9 IGA NEPHROPATHY: Status: ACTIVE | Noted: 2024-10-13

## 2024-10-13 PROBLEM — Z3A.39 39 WEEKS GESTATION OF PREGNANCY: Status: RESOLVED | Noted: 2023-10-05 | Resolved: 2024-10-13

## 2024-10-13 PROBLEM — O09.293 HISTORY OF PRE-ECLAMPSIA IN PRIOR PREGNANCY, CURRENTLY PREGNANT, THIRD TRIMESTER: Status: RESOLVED | Noted: 2023-12-26 | Resolved: 2024-10-13

## 2024-10-13 PROBLEM — R80.1 PERSISTENT PROTEINURIA: Status: RESOLVED | Noted: 2023-10-05 | Resolved: 2024-10-13

## 2024-10-13 PROBLEM — O09.299 HISTORY OF PRE-ECLAMPSIA IN PRIOR PREGNANCY, CURRENTLY PREGNANT: Status: RESOLVED | Noted: 2023-10-05 | Resolved: 2024-10-13

## 2024-10-13 NOTE — ASSESSMENT & PLAN NOTE
Patient aware of high risk of preeclampsia in next pregnancy or developing chronic hypertension  Recommend low-sodium diet, exercise, healthy diet  Not hypertensive at this time

## 2024-10-13 NOTE — ASSESSMENT & PLAN NOTE
Time of diagnosis, August 2024  Biopsy sent to [facility] labs  78 glomeruli  6 with global glomerulosclerosis  Minimal interstitial fibrosis and tubular atrophy  M1 E0 S1 T0C0, mild disease  Continue with lisinopril 2.5 mg, unable to tolerate higher dose due to dizziness and hypotension  Proteinuria to 35 mg/g, trending down  If no improvement of proteinuria or worsening proteinuria we can add SGLT2 inhibitors  Informed of teratogenic effects of Acei/ARB. Advised to stop Lisinopril immediately if pregnant    Orders:    Basic metabolic panel; Future    Urinalysis with microscopic; Future    Albumin / creatinine urine ratio; Future

## 2024-10-13 NOTE — ASSESSMENT & PLAN NOTE
UACR trending down to 235 mg/g with lisinopril  Secondary to IgA nephropathy as above  Continue with lisinopril  If worsening proteinuria will plan for SGLT2 inhibitors  Orders:    Albumin / creatinine urine ratio; Future

## 2024-10-15 ENCOUNTER — EVALUATION (OUTPATIENT)
Dept: PHYSICAL THERAPY | Facility: MEDICAL CENTER | Age: 29
End: 2024-10-15
Payer: COMMERCIAL

## 2024-10-15 DIAGNOSIS — M71.22 BAKER'S CYST OF KNEE, LEFT: Primary | ICD-10-CM

## 2024-10-15 DIAGNOSIS — M76.52 PATELLAR TENDINITIS OF LEFT KNEE: ICD-10-CM

## 2024-10-15 PROCEDURE — 97161 PT EVAL LOW COMPLEX 20 MIN: CPT

## 2024-10-15 NOTE — PROGRESS NOTES
PT Evaluation     Today's date: 10/15/2024  Patient name: Mariela Mtz  : 1995  MRN: [id number]  Referring provider: Ahsan Moss DO  Dx:   Encounter Diagnosis     ICD-10-CM    1. Baker's cyst of knee, left  M71.22 Ambulatory Referral to Physical Therapy      2. Patellar tendinitis of left knee  M76.52 Ambulatory Referral to Physical Therapy                     Assessment  Impairments: abnormal muscle firing, abnormal movement, impaired physical strength, pain with function and participation limitations  Symptom irritability: low    Assessment details: Mariela Mtz  is a pleasant 29 y.o. female who presents with c/c of posterior L knee pain accompanied with localized effusion.  The primary movement problem is L patellar hypomobility consistent with Baker's cyst resulting in altered LE tissue extensibility, decreased L hip girdle strength, and decreased knee extension AROM. This limits her ability to return to PLOF with sports and negotiate stairs.  No referral is necessary at this time based on examination results.   The patient's greatest concern is the possibility of surgery.     Problem List:  1) L patellar hypomobility  2) Altered LE tissue extensibility  3) L knee ROM limitations     Pt. will benefit from skilled PT services that includes manual therapy techniques to enhance tissue extensibility, neuromuscular re-education to facilitate motor control, therapeutic exercise to increase functional mobility, and modalities prn to reduce pain and inflammation.   Understanding of Dx/Px/POC: good     Prognosis: good    Goals  Impairment Goals  - Pt I with initial HEP in 1-2 visits  - Improve ROM equal to contralateral side in 4-6 weeks  - Increase strength to 5/5 in all affected areas in 4-6 weeks    Functional Goals  - Patient will be independent with comprehensive HEP in 6-8 weeks  - Ambulation is improved to prior level of function in 6-8 weeks  - Stair climbing is improved to prior level of function  "in 6-8 weeks  - Squatting is improved to prior level of function in 6-8 weeks   - Patient will return to soccer PLOF without any reproduction of symptoms in 6-8 weeks    Plan  Patient would benefit from: skilled physical therapy  Referral necessary: No  Planned modality interventions: cryotherapy, electrical stimulation/Russian stimulation, low level laser therapy, neuromuscular electric stimulation, TENS, thermotherapy: hydrocollator packs, traction, ultrasound and unattended electrical stimulation    Planned therapy interventions: activity modification, body mechanics training, flexibility, functional ROM exercises, gait training, graded exercise, home exercise program, IASTM, joint mobilization, manual therapy, motor coordination training, neuromuscular re-education, patient/caregiver education, strengthening, stretching, therapeutic activities, therapeutic exercise and therapeutic training    Frequency: 2-3x week  Duration in weeks: 12  Plan of Care beginning date: 10/15/2024  Treatment plan discussed with: patient        Subjective Evaluation    History of Present Illness  Mechanism of injury: Mariela Mtz presents with c/c of L posterior knee pain with observational effusion. Symptoms began 3 months ago with mechanism of injury: insidious onset.  Aggravating factors: riding her stationary bike, negotiating stairs   Relieving factors: tylenol/ibuprofen   24hr pain pattern: 1/10 (current), 0/10 (best), 6/10 (worst), location:posterior L knee, descriptors: heavy, fluid-filled  Imaging: None  Previous treatments: None   Occupation/recreation: dietitian   Primary concern: \"Afraid of going the surgical route\"   Patient goals: Be educated on her diagnosis, return to sports PLOF (soccer), participate in a half-marathon in the near future     Pt reports she has flare ups of swelling and pain of the posterior L knee. She was and still is an athlete, playing soccer and running recreationally. Denies buckling/giving out " of the L knee.           Recurrent probem    Patient Goals  Patient goals for therapy: decreased pain, increased strength, independence with ADLs/IADLs, increased motion, return to sport/leisure activities and decreased edema      Diagnostic Tests  No diagnostic tests performed        Objective     Static Posture     Comments  Observation (edema/effusion): mild effusion of the posterior L knee as compared to the R      Squat assessment: unremarkable     Lateral step down testing:   L= unremarkable  R= unremarkable      TTP:  L= Patellar tendon   R= unremarkable      MMT Strength Testing (out of 5):  Hip flexion: L= 5                        R= 5   Hip ER: L= 5                             R=5  Hip IR: L=5                               R=5  Hip abduction: L= 5                   R= 5   Hip extension: L= 4 with pain                  R=5   Knee extension: L= 4 with pain                 R=  5  Knee flexion: L= 5                     R=  5  Ankle Dorsiflexion: L=5             R= 5  Quad contraction (good, fair, poor): L= fair            R= good      Range of Motion Testing (deg) (AROM/PROM):  Knee flexion (0-135): L= 140                          R= 140    Knee extension (0-(-10)): L= -1                      R= -5     Patellar mobility (inferior, superior, lateral, medial):   L= inferior, lateral, and superior hypomobile   R= hypermobile in all directions     Hip Flexibility:  Hamstrings (90-90): L= restricted               R= restricted  Ely: L= restricted                        R= restricted     Special Tests:  Patellar compression (Assess patellofemoral joint): L= positive                R= negative  Varus stress: L= negative                             R= negative  Valgus stress: L= negative                            R= negative             Precautions: Pre-clampsia episode      Manuals 10/15            R knee flexion gr3-4 mobs                                                    Neuro Re-Ed             Patient education  "and HEP demo 15 min            Lateral heel tap                                                                              Ther Ex             Prone quad stretch 3x30\"            Single leg bridges 3x10            Long sitting calf stretch             Standing calf stretch             Supine hamstring stretch             Banded TKE             TB clamshells             Pball hamstring curl             Ther Activity                                       Gait Training                                       Modalities                                            "

## 2024-10-22 ENCOUNTER — OFFICE VISIT (OUTPATIENT)
Dept: PHYSICAL THERAPY | Facility: MEDICAL CENTER | Age: 29
End: 2024-10-22
Payer: COMMERCIAL

## 2024-10-22 DIAGNOSIS — M71.22 BAKER'S CYST OF KNEE, LEFT: Primary | ICD-10-CM

## 2024-10-22 DIAGNOSIS — M76.52 PATELLAR TENDINITIS OF LEFT KNEE: ICD-10-CM

## 2024-10-22 PROCEDURE — 97110 THERAPEUTIC EXERCISES: CPT

## 2024-10-22 NOTE — PROGRESS NOTES
"Daily Note     Today's date: 10/22/2024  Patient name: Mariela Mtz  : 1995  MRN: [id number]  Referring provider: Ahsan Moss DO  Dx:   Encounter Diagnosis     ICD-10-CM    1. Baker's cyst of knee, left  M71.22       2. Patellar tendinitis of left knee  M76.52                      Subjective: Pt reports that she played soccer over the weekend and she feels pretty good.      Objective: See treatment diary below      Assessment: Tolerated treatment well. Patient demonstrated fatigue post treatment and would benefit from continued PT. Pt was introduced to all new exercises in the session today consisting of LE flexibility surrounding her Baker's cyst. She did benefit from DTM to the posterior L knee, demonstrating improved symptoms post-tx. She required verbal and visual cues in order to demonstrate good technique and appropriate hold times.        Plan: Continue per plan of care.      Precautions: Pre-clampsia episode      Manuals 10/15 10/22           R knee flexion gr3-4 mobs  SA           DTM to posterior L knee  SA                                     Neuro Re-Ed             Patient education and HEP demo 15 min            Lateral heel tap                                                                              Ther Ex             Prone quad stretch 3x30\" 3x30\"           Single leg bridges 3x10 3x10           Long sitting calf stretch  3x30\"           Standing calf stretch  3x30\"           Supine hamstring stretch  3x30\"           TB hip extension and abduction  3x10 (R)           Prone hip extension   3x10            Banded TKE  2x10x5\"           TB clamshells             Pball hamstring curl             Ther Activity                                       Gait Training                                       Modalities                                            "

## 2024-10-30 ENCOUNTER — OFFICE VISIT (OUTPATIENT)
Dept: PHYSICAL THERAPY | Facility: MEDICAL CENTER | Age: 29
End: 2024-10-30
Payer: COMMERCIAL

## 2024-10-30 ENCOUNTER — APPOINTMENT (OUTPATIENT)
Dept: PHYSICAL THERAPY | Facility: MEDICAL CENTER | Age: 29
End: 2024-10-30
Payer: COMMERCIAL

## 2024-10-30 DIAGNOSIS — M76.52 PATELLAR TENDINITIS OF LEFT KNEE: ICD-10-CM

## 2024-10-30 DIAGNOSIS — M71.22 BAKER'S CYST OF KNEE, LEFT: Primary | ICD-10-CM

## 2024-10-30 PROCEDURE — 97110 THERAPEUTIC EXERCISES: CPT

## 2024-10-30 NOTE — PROGRESS NOTES
"Daily Note     Today's date: 10/30/2024  Patient name: Mariela Mtz  : 1995  MRN: [id number]  Referring provider: Ahsan Moss DO  Dx:   Encounter Diagnosis     ICD-10-CM    1. Baker's cyst of knee, left  M71.22       2. Patellar tendinitis of left knee  M76.52                      Subjective: Pt reports her symptoms flared up over the weekend after playing soccer. Felt it mainly with negotiating stairs.       Objective: See treatment diary below      Assessment: Tolerated treatment well. Patient demonstrated fatigue post treatment, exhibited good technique with therapeutic exercises, and would benefit from continued PT. Continued LLE tissue extensibility therapuetic exercises in the session today to gain symptom relief. Introduced to dynamic hamstring strengthening, which she tolerated well. Slight reproduction of symptoms with prone quad sets and supine hip extension, but symptoms decreased following DTM to the posterior L knee.      Plan: Continue per plan of care.      Precautions: Pre-clampsia episode      Manuals 10/15 10/22 1030          L knee flexion gr3-4 mobs  SA SA          DTM to posterior L knee  SA SA          IASTM to the posterior L knee   SA                        Neuro Re-Ed             Patient education and HEP demo 15 min            Lateral heel tap                                                                              Ther Ex             Prone quad stretch 3x30\" 3x30\" 3x30\"          Single leg bridges 3x10 3x10           Long sitting calf stretch  3x30\" 3x30\"           Standing calf stretch  3x30\" 3x30\"          Supine hamstring stretch  3x30\" 3x30\"          TB hip extension and abduction  3x10 (R)           Prone hip extension    3x10x4#          Prone hip extension   3x10            Banded TKE  2x10x5\"           Prone quad set   3x10x5\"           TB clamshells             Pball hamstring curl   3x10          Ther Activity                                       Gait Training "                                       Modalities

## 2024-10-31 ENCOUNTER — ANNUAL EXAM (OUTPATIENT)
Dept: OBGYN CLINIC | Facility: CLINIC | Age: 29
End: 2024-10-31
Payer: COMMERCIAL

## 2024-10-31 VITALS
HEIGHT: 67 IN | SYSTOLIC BLOOD PRESSURE: 118 MMHG | WEIGHT: 140 LBS | BODY MASS INDEX: 21.97 KG/M2 | DIASTOLIC BLOOD PRESSURE: 66 MMHG

## 2024-10-31 DIAGNOSIS — Z01.419 ENCOUNTER FOR WELL WOMAN EXAM: Primary | ICD-10-CM

## 2024-10-31 DIAGNOSIS — Z12.39 ENCOUNTER FOR SCREENING BREAST EXAMINATION: ICD-10-CM

## 2024-10-31 PROCEDURE — S0612 ANNUAL GYNECOLOGICAL EXAMINA: HCPCS | Performed by: PHYSICIAN ASSISTANT

## 2024-11-04 ENCOUNTER — OFFICE VISIT (OUTPATIENT)
Dept: PHYSICAL THERAPY | Facility: MEDICAL CENTER | Age: 29
End: 2024-11-04
Payer: COMMERCIAL

## 2024-11-04 DIAGNOSIS — M76.52 PATELLAR TENDINITIS OF LEFT KNEE: ICD-10-CM

## 2024-11-04 DIAGNOSIS — M71.22 BAKER'S CYST OF KNEE, LEFT: Primary | ICD-10-CM

## 2024-11-04 PROCEDURE — 97110 THERAPEUTIC EXERCISES: CPT

## 2024-11-04 NOTE — PROGRESS NOTES
"Daily Note     Today's date: 2024  Patient name: Mariela Mtz  : 1995  MRN: [id number]  Referring provider: Ahsan Moss DO  Dx:   Encounter Diagnosis     ICD-10-CM    1. Baker's cyst of knee, left  M71.22       2. Patellar tendinitis of left knee  M76.52                      Subjective: Pt reports she is flared up more than usual today regarding her Baker's cyst.       Objective: See treatment diary below      Assessment: Tolerated treatment well. Patient demonstrated fatigue post treatment, exhibited good technique with therapeutic exercises, and would benefit from continued PT. Continues to benefit from LLE tissue extensibility, noting decreased symptoms overall. If symptoms continue to persist with her Baker's cyst, appropriate follow-up may be warranted which the patient was educated on and agreeable to.       Plan: Continue per plan of care.      Precautions: Pre-clampsia episode      Manuals 10/15 10/22 10/30 11/4         L knee flexion gr3-4 mobs  SA SA SA         DTM to posterior L knee  SA SA SA         IASTM to the posterior L knee   SA                        Neuro Re-Ed             Patient education and HEP demo 15 min            Lateral heel tap                                                                              Ther Ex             Prone quad stretch 3x30\" 3x30\" 3x30\" 3x30\"         Single leg bridges 3x10 3x10  3x10         Long sitting calf stretch  3x30\" 3x30\"  3x30\"         Standing calf stretch  3x30\" 3x30\"          Supine hamstring stretch with strap  3x30\" 3x30\" 3x30\"         TB hip extension and abduction  3x10 (R)           Prone hip extension    3x10x4#          Prone hip extension   3x10            Banded TKE  2x10x5\"  2x10x5\"         Prone quad set   3x10x5\"           TB clamshells             Pball hamstring curl   3x10 3x10         Ther Activity                                       Gait Training                                       Modalities             Heat    " "5\"                               "

## 2024-11-05 NOTE — PROGRESS NOTES
Assessment/Plan:      Diagnoses and all orders for this visit:    Encounter for well woman exam    Encounter for screening breast examination          Subjective:     Patient ID: Mariela Mtz is a 29 y.o. female.    Pt presents for her annual exam today--  She has no  major gyn complaints  Delivered 2/2024  Still nursing  Had mastitis early on and all good since  She has no bleeding or pelvic pain  Bowel and bladder are regular  No breast concerns today  Mood improved/stable on Sertraline  Condoms for BC    No pap today.            Review of Systems   Constitutional:  Negative for chills, fever and unexpected weight change.   HENT:  Negative for ear pain and sore throat.    Eyes:  Negative for pain and visual disturbance.   Respiratory:  Negative for cough and shortness of breath.    Cardiovascular:  Negative for chest pain and palpitations.   Gastrointestinal:  Negative for abdominal pain, blood in stool, constipation, diarrhea and vomiting.   Genitourinary: Negative.  Negative for dysuria and hematuria.   Musculoskeletal:  Negative for arthralgias and back pain.   Skin:  Negative for color change and rash.   Neurological:  Negative for seizures and syncope.   All other systems reviewed and are negative.        Objective:     Physical Exam  Vitals and nursing note reviewed.   Constitutional:       Appearance: Normal appearance. She is well-developed.   HENT:      Head: Normocephalic and atraumatic.   Chest:   Breasts:     Right: No inverted nipple, mass, nipple discharge or skin change.      Left: No inverted nipple, mass, nipple discharge or skin change.   Abdominal:      Palpations: Abdomen is soft.   Genitourinary:     General: Normal vulva.      Exam position: Supine.      Labia:         Right: No rash, tenderness or lesion.         Left: No rash, tenderness or lesion.       Vagina: Normal.      Cervix: No cervical motion tenderness, discharge or friability.      Uterus: Normal.       Adnexa: Right adnexa  normal and left adnexa normal.        Right: No mass, tenderness or fullness.          Left: No mass, tenderness or fullness.     Musculoskeletal:      Cervical back: Normal range of motion.   Lymphadenopathy:      Lower Body: No right inguinal adenopathy. No left inguinal adenopathy.   Neurological:      Mental Status: She is alert.

## 2024-11-06 ENCOUNTER — OFFICE VISIT (OUTPATIENT)
Dept: PHYSICAL THERAPY | Facility: MEDICAL CENTER | Age: 29
End: 2024-11-06
Payer: COMMERCIAL

## 2024-11-06 DIAGNOSIS — M76.52 PATELLAR TENDINITIS OF LEFT KNEE: ICD-10-CM

## 2024-11-06 DIAGNOSIS — M71.22 BAKER'S CYST OF KNEE, LEFT: Primary | ICD-10-CM

## 2024-11-06 PROCEDURE — 97110 THERAPEUTIC EXERCISES: CPT

## 2024-11-06 NOTE — PROGRESS NOTES
"Daily Note     Today's date: 2024  Patient name: Mariela Mtz  : 1995  MRN: [id number]  Referring provider: Ahsan Moss DO  Dx:   Encounter Diagnosis     ICD-10-CM    1. Baker's cyst of knee, left  M71.22       2. Patellar tendinitis of left knee  M76.52                      Subjective: Pt reports she feels better than last visit.       Objective: See treatment diary below      Assessment: Tolerated treatment well. Patient exhibited good technique with therapeutic exercises and would benefit from continued PT. Continue treatment as tolerated due to the positive response that Mariela is currently experiencing. Asked to self-assess her symptoms over the weekend.       Plan: Continue per plan of care.      Precautions: Pre-clampsia episode      Manuals 10/15 10/22 10/30 11/4 11/6        L knee flexion gr3-4 mobs  SA SA SA         DTM to posterior L knee  SA SA SA SA        IASTM to the posterior L knee   SA                        Neuro Re-Ed             Patient education and HEP demo 15 min            Lateral heel tap                                                                              Ther Ex             Prone quad stretch 3x30\" 3x30\" 3x30\" 3x30\" 3x30\"        Single leg bridges 3x10 3x10  3x10 3x10        Long sitting calf stretch  3x30\" 3x30\"  3x30\" 3x30\"        Standing calf stretch  3x30\" 3x30\"          Supine hamstring stretch with strap  3x30\" 3x30\" 3x30\" 3x30\"        TB hip extension and abduction  3x10 (R)           Prone hip extension    3x10x4#          Prone hip extension   3x10            Banded TKE  2x10x5\"  2x10x5\"         Prone quad set   3x10x5\"   3x10        Prone hamstring curl     3x10x3#        TB clamshells     3x10 (G)        Pball hamstring curl   3x10 3x10         Ther Activity                                       Gait Training                                       Modalities             Heat    5\"                                 "

## 2024-11-11 ENCOUNTER — OFFICE VISIT (OUTPATIENT)
Dept: PHYSICAL THERAPY | Facility: MEDICAL CENTER | Age: 29
End: 2024-11-11
Payer: COMMERCIAL

## 2024-11-11 DIAGNOSIS — M76.52 PATELLAR TENDINITIS OF LEFT KNEE: ICD-10-CM

## 2024-11-11 DIAGNOSIS — M71.22 BAKER'S CYST OF KNEE, LEFT: Primary | ICD-10-CM

## 2024-11-11 PROCEDURE — 97110 THERAPEUTIC EXERCISES: CPT

## 2024-11-11 NOTE — PROGRESS NOTES
"Daily Note     Today's date: 2024  Patient name: Mariela Mtz  : 1995  MRN: [id number]  Referring provider: Ahsan Moss DO  Dx:   Encounter Diagnosis     ICD-10-CM    1. Baker's cyst of knee, left  M71.22       2. Patellar tendinitis of left knee  M76.52                      Subjective: Pt reports she felt good after last visit, but symptoms were reproduced after going for a run over the weekend.      Objective: See treatment diary below      Assessment: Tolerated treatment well. Patient exhibited good technique with therapeutic exercises and would benefit from continued PT. Continues to benefit from LLE flexibility and DTM for symptom relief. May benefit from possible follow-up if symptoms are unchanged at her next PT appointment to navigate cause of the Baker's cyst.       Plan: Continue per plan of care.      Precautions: Pre-clampsia episode      Manuals 10/15 10/22 10/30 11/4 11/6 11/11       L knee flexion gr3-4 mobs  SA SA SA         DTM to posterior L knee  SA SA SA SA SA       IASTM to the posterior L knee   SA                        Neuro Re-Ed             Patient education and HEP demo 15 min            Lateral heel tap                                                                              Ther Ex             Prone quad stretch 3x30\" 3x30\" 3x30\" 3x30\" 3x30\"        Single leg bridges with toe raise 3x10 3x10  3x10 3x10 3x10       Long sitting calf stretch  3x30\" 3x30\"  3x30\" 3x30\"        Standing calf stretch  3x30\" 3x30\"   5x20\"       Supine hamstring stretch with strap  3x30\" 3x30\" 3x30\" 3x30\" 3x30\"       TB hip extension and abduction  3x10 (R)           Prone hip extension    3x10x4#          Prone hip extension   3x10     3x10x2#       Banded TKE  2x10x5\"  2x10x5\"         Prone quad set   3x10x5\"   3x10 3x10       Prone hamstring curl     3x10x3#        TB clamshells     3x10 (G)        Pball hamstring curl   3x10 3x10  3x10       Ther Activity                                 " "      Gait Training                                       Modalities             Heat    5\"                                   "

## 2024-11-13 ENCOUNTER — OFFICE VISIT (OUTPATIENT)
Dept: PHYSICAL THERAPY | Facility: MEDICAL CENTER | Age: 29
End: 2024-11-13
Payer: COMMERCIAL

## 2024-11-13 DIAGNOSIS — M71.22 BAKER'S CYST OF KNEE, LEFT: Primary | ICD-10-CM

## 2024-11-13 DIAGNOSIS — M76.52 PATELLAR TENDINITIS OF LEFT KNEE: ICD-10-CM

## 2024-11-13 PROCEDURE — 97110 THERAPEUTIC EXERCISES: CPT

## 2024-11-13 NOTE — PROGRESS NOTES
"Daily Note     Today's date: 2024  Patient name: Mariela Mtz  : 1995  MRN: [id number]  Referring provider: Ahsan Moss DO  Dx:   Encounter Diagnosis     ICD-10-CM    1. Baker's cyst of knee, left  M71.22       2. Patellar tendinitis of left knee  M76.52                      Subjective: Pt reports her L knee feels better.      Objective: See treatment diary below      Assessment: Tolerated treatment well. Patient exhibited good technique with therapeutic exercises and would benefit from continued PT. Continues to benefit from DTM to the L knee. Self-assess her symptoms over the weekend in order to observe if she has having a positive response to treatment.      Plan: Continue per plan of care.      Precautions: Pre-clampsia episode      Manuals 10/15 10/22 10/30 11/4 11/6 11/11 11/13      L knee flexion gr3-4 mobs  SA SA SA         DTM to posterior L knee  SA SA SA SA SA SA      IASTM to the posterior L knee   SA                        Neuro Re-Ed             Patient education and HEP demo 15 min            Lateral heel tap                                                                              Ther Ex             Prone quad stretch 3x30\" 3x30\" 3x30\" 3x30\" 3x30\"  3x30\"      Single leg bridges with toe raise 3x10 3x10  3x10 3x10 3x10       Long sitting calf stretch  3x30\" 3x30\"  3x30\" 3x30\"        Standing calf stretch  3x30\" 3x30\"   5x20\" 5x20\"      Supine hamstring stretch with strap  3x30\" 3x30\" 3x30\" 3x30\" 3x30\" 3x30\"      TB hip extension and abduction  3x10 (R)           Prone hip extension    3x10x4#          Prone hip extension   3x10     3x10x2# 3x10x2#      Banded TKE  2x10x5\"  2x10x5\"         Prone quad set   3x10x5\"   3x10 3x10 3x10      Prone hamstring curl     3x10x3#        TB clamshells     3x10 (G)        Pball hamstring curl   3x10 3x10  3x10 3x10      Ther Activity                                       Gait Training                                       Modalities           " "  Heat    5\"                                     "

## 2024-11-18 ENCOUNTER — APPOINTMENT (OUTPATIENT)
Dept: PHYSICAL THERAPY | Facility: MEDICAL CENTER | Age: 29
End: 2024-11-18
Payer: COMMERCIAL

## 2024-11-20 ENCOUNTER — OFFICE VISIT (OUTPATIENT)
Dept: PHYSICAL THERAPY | Facility: MEDICAL CENTER | Age: 29
End: 2024-11-20
Payer: COMMERCIAL

## 2024-11-20 DIAGNOSIS — M76.52 PATELLAR TENDINITIS OF LEFT KNEE: ICD-10-CM

## 2024-11-20 DIAGNOSIS — M71.22 BAKER'S CYST OF KNEE, LEFT: Primary | ICD-10-CM

## 2024-11-20 PROCEDURE — 97110 THERAPEUTIC EXERCISES: CPT

## 2024-11-20 NOTE — PROGRESS NOTES
"Daily Note     Today's date: 2024  Patient name: Mariela Mtz  : 1995  MRN: [id number]  Referring provider: Ahsan Moss DO  Dx:   Encounter Diagnosis     ICD-10-CM    1. Baker's cyst of knee, left  M71.22       2. Patellar tendinitis of left knee  M76.52                      Subjective: Pt reports her symptoms are improving.       Objective: See treatment diary below      Assessment: Tolerated treatment well. Patient exhibited good technique with therapeutic exercises and would benefit from continued PT. Mariela continues to demonstrate positive response to PT treatment program due to her low symptoms irritability.       Plan: Continue per plan of care.      Precautions: Pre-clampsia episode      Manuals 10/15 10/22 10/30 11/4 11/6 11/11 11/13 11/20     L knee flexion gr3-4 mobs  SA SA SA         DTM to posterior L knee  SA SA SA SA SA SA SA     IASTM to the posterior L knee   SA                        Neuro Re-Ed             Patient education and HEP demo 15 min            Lateral heel tap             Backward step up        3x10                                                         Ther Ex             Prone quad stretch 3x30\" 3x30\" 3x30\" 3x30\" 3x30\"  3x30\" 3x30\"     Single leg bridges with toe raise 3x10 3x10  3x10 3x10 3x10  3x10     Long sitting calf stretch  3x30\" 3x30\"  3x30\" 3x30\"        Standing calf stretch  3x30\" 3x30\"   5x20\" 5x20\" 5x20\"      Supine hamstring stretch with strap  3x30\" 3x30\" 3x30\" 3x30\" 3x30\" 3x30\" 3x30\"     TB hip extension and abduction  3x10 (R)      3x10 (BL)     Prone hip extension    3x10x4#          Prone hip extension   3x10     3x10x2# 3x10x2# 3x10x2#     Banded TKE  2x10x5\"  2x10x5\"         Prone quad set   3x10x5\"   3x10 3x10 3x10 3x10     Prone hamstring curl     3x10x3#        TB clamshells     3x10 (G)        Pball hamstring curl   3x10 3x10  3x10 3x10      Ther Activity                                       Gait Training                                    " "   Modalities             Heat    5\"                                       "

## 2024-11-25 ENCOUNTER — APPOINTMENT (OUTPATIENT)
Dept: PHYSICAL THERAPY | Facility: MEDICAL CENTER | Age: 29
End: 2024-11-25
Payer: COMMERCIAL

## 2024-11-27 ENCOUNTER — OFFICE VISIT (OUTPATIENT)
Dept: PHYSICAL THERAPY | Facility: MEDICAL CENTER | Age: 29
End: 2024-11-27
Payer: COMMERCIAL

## 2024-11-27 DIAGNOSIS — M76.52 PATELLAR TENDINITIS OF LEFT KNEE: ICD-10-CM

## 2024-11-27 DIAGNOSIS — M71.22 BAKER'S CYST OF KNEE, LEFT: Primary | ICD-10-CM

## 2024-11-27 PROCEDURE — 97110 THERAPEUTIC EXERCISES: CPT

## 2024-11-27 NOTE — PROGRESS NOTES
"Daily Note     Today's date: 2024  Patient name: Mariela Mtz  : 1995  MRN: [id number]  Referring provider: Ahsan Moss DO  Dx:   Encounter Diagnosis     ICD-10-CM    1. Baker's cyst of knee, left  M71.22       2. Patellar tendinitis of left knee  M76.52                      Subjective: Pt reports her symptoms are unchanged.       Objective: See treatment diary below      Assessment: Tolerated treatment well. Patient exhibited good technique with therapeutic exercises and would benefit from continued PT. Due to her unchanged symptoms over multiple PT visits and adherence to HEP, Mariela may benefit from follow-up MRI to find the source causing the Baker's cyst, if any. Maintained consistent treatment plan in the visit today. Treatment sessions tailored off to 1x/week for subsequent visits.     Plan: Continue per plan of care.      Precautions: Pre-clampsia episode      Manuals 10/15 10/22 10/30 11/4 11/6 11/11 11/13 11/20 11/27     L knee flexion gr3-4 mobs  SA SA SA         DTM to posterior L knee  SA SA SA SA SA SA SA SA    IASTM to the posterior L knee   SA                        Neuro Re-Ed             Patient education and HEP demo 15 min            Lateral heel tap             Backward step up        3x10                                                         Ther Ex             Prone quad stretch 3x30\" 3x30\" 3x30\" 3x30\" 3x30\"  3x30\" 3x30\"     Single leg bridges with toe raise 3x10 3x10  3x10 3x10 3x10  3x10     Long sitting calf stretch  3x30\" 3x30\"  3x30\" 3x30\"        Standing calf stretch  3x30\" 3x30\"   5x20\" 5x20\" 5x20\"  5x20\"     Supine hamstring stretch with strap  3x30\" 3x30\" 3x30\" 3x30\" 3x30\" 3x30\" 3x30\" 5x20\"     TB hip extension and abduction  3x10 (R)      3x10 (BL)     Prone hip extension   3x10     3x10x2# 3x10x2# 3x10x2# 3x10x4#    Banded TKE  2x10x5\"  2x10x5\"         Prone quad set   3x10x5\"   3x10 3x10 3x10 3x10 3x10     Prone hamstring curl     3x10x3#        TB clamshells   " "  3x10 (G)        Pball hamstring curl   3x10 3x10  3x10 3x10  3x10    Ther Activity                                       Gait Training                                       Modalities             Heat    5\"                                         "

## 2024-12-04 ENCOUNTER — OFFICE VISIT (OUTPATIENT)
Dept: PHYSICAL THERAPY | Facility: MEDICAL CENTER | Age: 29
End: 2024-12-04
Payer: COMMERCIAL

## 2024-12-04 DIAGNOSIS — M76.52 PATELLAR TENDINITIS OF LEFT KNEE: ICD-10-CM

## 2024-12-04 DIAGNOSIS — M71.22 BAKER'S CYST OF KNEE, LEFT: Primary | ICD-10-CM

## 2024-12-04 PROCEDURE — 97110 THERAPEUTIC EXERCISES: CPT

## 2024-12-04 NOTE — PROGRESS NOTES
"Daily Note     Today's date: 2024  Patient name: Mariela Mtz  : 1995  MRN: [id number]  Referring provider: Ahsan Moss DO  Dx:   Encounter Diagnosis     ICD-10-CM    1. Baker's cyst of knee, left  M71.22       2. Patellar tendinitis of left knee  M76.52                      Subjective: Pt reports she has her follow-up scheduled 12/10.      Objective: See treatment diary below      Assessment: Tolerated treatment well. Patient exhibited good technique with therapeutic exercises and would benefit from continued PT. Mariela does display a positive response to PT, however is bothered by the feeling of the Baker's cyst. Session consisted of LLE tissue extensibility and posterior chain strengthening.       Plan: Continue per plan of care.      Precautions: Pre-clampsia episode      Manuals 10/15 10/22 10/30 11/4 11/6 11/11 11/13 11/20 11/27  12/4    L knee flexion gr3-4 mobs  SA SA SA         DTM to posterior L knee  SA SA SA SA SA SA SA SA SA   IASTM to the posterior L knee   SA                        Neuro Re-Ed             Patient education and HEP demo 15 min            Lateral heel tap             Backward step up        3x10                                                         Ther Ex             Prone quad stretch 3x30\" 3x30\" 3x30\" 3x30\" 3x30\"  3x30\" 3x30\"  5x20\"   Single leg bridges with toe raise 3x10 3x10  3x10 3x10 3x10  3x10  3x10   Long sitting calf stretch  3x30\" 3x30\"  3x30\" 3x30\"     5x20\"   Standing calf stretch  3x30\" 3x30\"   5x20\" 5x20\" 5x20\"  5x20\"     Supine hamstring stretch with strap  3x30\" 3x30\" 3x30\" 3x30\" 3x30\" 3x30\" 3x30\" 5x20\"  5x20\"   TB hip extension and abduction  3x10 (R)      3x10 (BL)     Prone hip extension   3x10     3x10x2# 3x10x2# 3x10x2# 3x10x4# 3x10x5#   Banded TKE  2x10x5\"  2x10x5\"         Prone quad set   3x10x5\"   3x10 3x10 3x10 3x10 3x10  3x10   Prone hamstring curl     3x10x3#        TB clamshells     3x10 (G)        Pball hamstring curl   3x10 3x10  3x10 " "3x10  3x10 3x10   Ther Activity                                       Gait Training                                       Modalities             Heat    5\"                                           "

## 2024-12-10 ENCOUNTER — APPOINTMENT (OUTPATIENT)
Dept: RADIOLOGY | Facility: MEDICAL CENTER | Age: 29
End: 2024-12-10
Payer: COMMERCIAL

## 2024-12-10 ENCOUNTER — OFFICE VISIT (OUTPATIENT)
Dept: OBGYN CLINIC | Facility: MEDICAL CENTER | Age: 29
End: 2024-12-10
Payer: COMMERCIAL

## 2024-12-10 VITALS
HEART RATE: 60 BPM | BODY MASS INDEX: 21.5 KG/M2 | HEIGHT: 67 IN | WEIGHT: 137 LBS | SYSTOLIC BLOOD PRESSURE: 121 MMHG | DIASTOLIC BLOOD PRESSURE: 77 MMHG

## 2024-12-10 DIAGNOSIS — M71.22 BAKER'S CYST OF KNEE, LEFT: Primary | ICD-10-CM

## 2024-12-10 DIAGNOSIS — M76.52 PATELLAR TENDINITIS OF LEFT KNEE: ICD-10-CM

## 2024-12-10 DIAGNOSIS — M71.22 BAKER'S CYST OF KNEE, LEFT: ICD-10-CM

## 2024-12-10 PROCEDURE — 99213 OFFICE O/P EST LOW 20 MIN: CPT | Performed by: ORTHOPAEDIC SURGERY

## 2024-12-10 PROCEDURE — 73560 X-RAY EXAM OF KNEE 1 OR 2: CPT

## 2024-12-10 NOTE — PROGRESS NOTES
Ortho Sports Medicine Knee Follow Up Visit     Assesment:     29 y.o. female left knee posteromedial cyst consistent with baker's cyst and patellar tendonitis.    Plan:    Conservative treatment:    Ice to knee for 20 minutes at least 1-2 times daily.  XR of the left knee 2 views were ordered and will be reviewed with the patient at her next appointment. Patient did not get xrays at her previous appointment.  MRI left knee wo contrast ordered to evaluate baker's cyst.  Explained to the patient the mass of her left knee is consistent with Baker's cyst she can consider referral for US guided aspiration of Baker's cyst.  If cyst comes back suspicious we will plan to refer patient to Dr. Pj Dsouza, Orthopedic Oncologist.      Imaging:    No imaging was available for review today.      Injection:    No Injection planned at this time.      Surgery:     No surgery is recommended at this point, continue with conservative treatment plan as noted.    Follow up:    Return for Recheck after MRI .        Chief Complaint   Patient presents with    Left Knee - Follow-up, Pain, Swelling       History of Present Illness:    The patient is returns for follow up of left knee Baker's cyst and patellar tendinitis.  Patient reports she has attended expected course of physical therapy that has helped her patellar tendinitis, however she does not note improvement in the cyst about the posterior medial aspect of her knee.  This cyst is slightly inferior to a typical Baker's cyst.  Patient reports this cyst is not painful for her, however it is discomforting.  With MRI of the left knee before the new year to evaluate the cystic mass of her posterior medial left knee.    Knee Surgical History:  None    Past Medical, Social and Family History:  Past Medical History:   Diagnosis Date    Asthma     exercise induced    Preeclampsia     Varicella     childhood     Past Surgical History:   Procedure Laterality Date    IR BIOPSY KIDNEY RANDOM   8/13/2024    WISDOM TOOTH EXTRACTION       Allergies   Allergen Reactions    Cat Hair Extract Cough, Hives, Itching, Shortness Of Breath and Wheezing     Current Outpatient Medications on File Prior to Visit   Medication Sig Dispense Refill    lisinopril (ZESTRIL) 5 mg tablet Take 0.5 tablets (2.5 mg total) by mouth daily 90 tablet 1    Prenatal MV-Min-Fe Fum-FA-DHA (PRENATAL 1 PO) Take by mouth      sertraline (ZOLOFT) 25 mg tablet TAKE 1 TABLET BY MOUTH EVERY DAY 30 tablet 3     No current facility-administered medications on file prior to visit.     Social History     Socioeconomic History    Marital status: /Civil Union     Spouse name: Not on file    Number of children: Not on file    Years of education: Not on file    Highest education level: Not on file   Occupational History    Not on file   Tobacco Use    Smoking status: Never     Passive exposure: Never    Smokeless tobacco: Never   Vaping Use    Vaping status: Never Used   Substance and Sexual Activity    Alcohol use: Never    Drug use: Never    Sexual activity: Yes     Partners: Male     Birth control/protection: None   Other Topics Concern    Not on file   Social History Narrative    Not on file     Social Drivers of Health     Financial Resource Strain: Not on file   Food Insecurity: No Food Insecurity (2/16/2024)    Nursing - Inadequate Food Risk Classification     Worried About Running Out of Food in the Last Year: Never true     Ran Out of Food in the Last Year: Never true     Ran Out of Food in the Last Year: Not on file   Transportation Needs: No Transportation Needs (2/16/2024)    PRAPARE - Transportation     Lack of Transportation (Medical): No     Lack of Transportation (Non-Medical): No   Physical Activity: Not on file   Stress: Not on file   Social Connections: Not on file   Intimate Partner Violence: Not on file   Housing Stability: High Risk (2/16/2024)    Housing Stability Vital Sign     Unable to Pay for Housing in the Last Year:  "No     Number of Times Moved in the Last Year: 2     Homeless in the Last Year: No         I have reviewed the past medical, surgical, social and family history, medications and allergies as documented in the EMR.    Review of systems: ROS is negative other than that noted in the HPI.  Constitutional: Negative for fatigue and fever.      Physical Exam:    Blood pressure 121/77, pulse 60, height 5' 7\" (1.702 m), weight 62.1 kg (137 lb), currently breastfeeding.    General/Constitutional: NAD, well developed, well nourished  HENT: Normocephalic, atraumatic  CV: Intact distal pulses, regular rate  Resp: No respiratory distress or labored breathing  GI: Soft and non-tender   Lymphatic: No lymphadenopathy palpated  Neuro: Alert and Oriented x 3, no focal deficits  Psych: Normal mood, normal affect, normal judgement, normal behavior  Skin: Warm, dry, no rashes, no erythema      Knee Exam (focused):               RIGHT LEFT   ROM:   0-130 0-130   Palpation: Effusion negative negative     MJL tenderness Negative   Negative  Cystic mass posteromedial knee     LJL tenderness Negative Negative   Meniscus: Mele Negative Negative    Apley's Compression Negative Negative   Instability: Varus stable stable     Valgus stable stable   Special Tests: Lachman Negative Negative     Posterior drawer Negative Negative     Anterior drawer Negative Negative     Pivot shift not tested not tested     Dial not tested not tested   Patella: Palpation no tenderness no tenderness     Mobility 1/4 1/4     Apprehension Negative Negative   Other: Single leg 1/4 squat not tested not tested           LE NV Exam: +2 DP/PT pulses bilaterally  Sensation intact to light touch L2-S1 bilaterally    No calf tenderness to palpation bilaterally      Knee Imaging    X-rays of the left knee were obtained during today's visit which demonstrates mild medial compartment joint space narrowing with osteophyte formation.  Osgood Schlatter's. No acute fracture or " dislocation.  I do not have radiology report.    Scribe Attestation      I,:  Charla Zaragoza am acting as a scribe while in the presence of the attending physician.:       I,:  Ahsan Moss, DO personally performed the services described in this documentation    as scribed in my presence.:

## 2024-12-11 ENCOUNTER — OFFICE VISIT (OUTPATIENT)
Dept: PHYSICAL THERAPY | Facility: MEDICAL CENTER | Age: 29
End: 2024-12-11
Payer: COMMERCIAL

## 2024-12-11 DIAGNOSIS — M76.52 PATELLAR TENDINITIS OF LEFT KNEE: ICD-10-CM

## 2024-12-11 DIAGNOSIS — M71.22 BAKER'S CYST OF KNEE, LEFT: Primary | ICD-10-CM

## 2024-12-11 PROCEDURE — 97110 THERAPEUTIC EXERCISES: CPT

## 2024-12-11 NOTE — PROGRESS NOTES
"Daily Note     Today's date: 2024  Patient name: Mariela Mtz  : 1995  MRN: [id number]  Referring provider: Ahsan oMss DO  Dx:   Encounter Diagnosis     ICD-10-CM    1. Baker's cyst of knee, left  M71.22       2. Patellar tendinitis of left knee  M76.52                      Subjective: Pt reports MRI of her L knee is scheduled for .      Objective: See treatment diary below      Assessment: Tolerated treatment well. Patient exhibited good technique with therapeutic exercises and would benefit from continued PT. Continued LLE tissue extensibility and LE posterior chain strengthening. Benefits from DTM to the posterior L knee.       Plan: Continue per plan of care.      Precautions: Pre-clampsia episode      Manuals   12    L knee flexion gr3-4 mobs             DTM to posterior L knee SA        SA SA   IASTM to the posterior L knee                          Neuro Re-Ed             Patient education and HEP demo             Lateral heel tap             Backward step up                                                                 Ther Ex             Prone quad stretch          5x20\"   Single leg bridges with toe raise 3x10          3x10   Long sitting calf stretch          5x20\"   Standing calf stretch 5x20\"         5x20\"     Supine hamstring stretch with strap 5x20\"        5x20\"  5x20\"   TB hip extension and abduction             Prone hip extension          3x10x4# 3x10x5#   Banded TKE 3x10            Prone quad set 3x10         3x10  3x10   Prone hamstring curl             TB clamshells             Pball hamstring curl 3x10         3x10 3x10   Ther Activity                                       Gait Training                                       Modalities             Heat                                                 "

## 2024-12-18 ENCOUNTER — APPOINTMENT (OUTPATIENT)
Dept: PHYSICAL THERAPY | Facility: MEDICAL CENTER | Age: 29
End: 2024-12-18
Payer: COMMERCIAL

## 2024-12-24 DIAGNOSIS — N02.B9 IGA NEPHROPATHY: ICD-10-CM

## 2024-12-24 RX ORDER — LISINOPRIL 5 MG/1
2.5 TABLET ORAL DAILY
Qty: 90 TABLET | Refills: 0 | Status: SHIPPED | OUTPATIENT
Start: 2024-12-24

## 2024-12-24 NOTE — TELEPHONE ENCOUNTER
OUT OF STATE REQUEST    Reason for call:   [x] Refill   [] Prior Auth  [] Other:     Office:   [] PCP/Provider -   [x] Specialty/Provider - Nephrology    Medication: lisinopril (ZESTRIL) 5 mg     Dose/Frequency: Take 0.5 tablets (2.5 mg total) by mouth daily     Quantity: 90    Pharmacy: [id number] - [place], [place] - [address]     Does the patient have enough for 3 days?   [] Yes   [x] No - Send as HP to POD

## 2024-12-30 ENCOUNTER — OFFICE VISIT (OUTPATIENT)
Dept: PHYSICAL THERAPY | Facility: MEDICAL CENTER | Age: 29
End: 2024-12-30
Payer: COMMERCIAL

## 2024-12-30 ENCOUNTER — HOSPITAL ENCOUNTER (OUTPATIENT)
Dept: RADIOLOGY | Facility: IMAGING CENTER | Age: 29
Discharge: HOME | End: 2024-12-30
Payer: COMMERCIAL

## 2024-12-30 DIAGNOSIS — M71.22 BAKER'S CYST OF KNEE, LEFT: Primary | ICD-10-CM

## 2024-12-30 DIAGNOSIS — M76.52 PATELLAR TENDINITIS OF LEFT KNEE: ICD-10-CM

## 2024-12-30 DIAGNOSIS — M71.22 BAKER'S CYST OF KNEE, LEFT: ICD-10-CM

## 2024-12-30 PROCEDURE — 97110 THERAPEUTIC EXERCISES: CPT

## 2024-12-30 PROCEDURE — 73721 MRI JNT OF LWR EXTRE W/O DYE: CPT

## 2024-12-30 NOTE — PROGRESS NOTES
"Daily Note     Today's date: 2024  Patient name: Mariela Mtz  : 1995  MRN: [id number]  Referring provider: Ahsan Moss DO  Dx:   Encounter Diagnosis     ICD-10-CM    1. Baker's cyst of knee, left  M71.22       2. Patellar tendinitis of left knee  M76.52                      Subjective: Pt reports she has a f/u with Dr. Moss 1/15. Did MRI today. Having similar symptoms as previous visits.       Objective: See treatment diary below      Assessment: Tolerated treatment well. Patient demonstrated fatigue post treatment, exhibited good technique with therapeutic exercises, and would benefit from continued PT. Continues to demonstrate improved symptoms post-tx with session focused on LLE tissue extensibility. Required verbal cueing with prone quad set to re-establish form. Assess MRI findings at next visit.       Plan: Continue per plan of care.      Precautions: Pre-clampsia episode      Manuals   12    L knee flexion gr3-4 mobs             DTM to posterior L knee SA SA        SA SA   IASTM to the posterior L knee                          Neuro Re-Ed             Patient education and HEP demo             Lateral heel tap             Backward step up                                                                 Ther Ex             Prone quad stretch  5x20\"        5x20\"   Single leg bridges with toe raise 3x10          3x10   Long sitting calf stretch  5x20\"        5x20\"   Standing calf stretch 5x20\"  5x20\"       5x20\"     Supine hamstring stretch with strap 5x20\" 5x20\"        5x20\"  5x20\"   TB hip extension and abduction  3x10 (BL)            Prone hip extension          3x10x4# 3x10x5#   Banded TKE 3x10 3x10           Prone quad set 3x10  3x10        3x10  3x10   Prone hamstring curl             TB clamshells             Pball hamstring curl 3x10  3x10       3x10 3x10   Ther Activity                                       Gait Training                                    "    Modalities             Heat

## 2025-01-29 ENCOUNTER — OFFICE VISIT (OUTPATIENT)
Dept: OBGYN CLINIC | Facility: MEDICAL CENTER | Age: 30
End: 2025-01-29
Payer: COMMERCIAL

## 2025-01-29 VITALS — HEIGHT: 67 IN | WEIGHT: 141 LBS | BODY MASS INDEX: 22.13 KG/M2

## 2025-01-29 DIAGNOSIS — M22.42 CHONDROMALACIA OF PATELLA, LEFT: ICD-10-CM

## 2025-01-29 DIAGNOSIS — M71.22 BAKER'S CYST OF KNEE, LEFT: Primary | ICD-10-CM

## 2025-01-29 PROCEDURE — 99214 OFFICE O/P EST MOD 30 MIN: CPT | Performed by: ORTHOPAEDIC SURGERY

## 2025-01-29 NOTE — PROGRESS NOTES
Ortho Sports Medicine Knee Follow Up Visit     Assesment:     29 y.o. female left knee symptomatic baker's cyst and grade 4 degenerative changes of lateral patella facet    Plan:    Conservative treatment:    Ice to knee for 20 minutes at least 1-2 times daily.  OTC NSAIDS prn for pain.  Let pain guide gradual return activities.    Imaging:    All imaging from today was reviewed by myself and explained to the patient.       Injection:    Referral to interventional radiology for ultrasound limited and guidance provided at today's visit for aspiration of left knee Baker's cyst      Surgery:     We did discuss that if she has no significant improvement from the ultrasound-guided aspiration by interventional radiology we may consider arthroscopic resection of Ramos's cyst.    Follow up:    No follow-ups on file.        Chief Complaint   Patient presents with    Left Knee - Follow-up     MRI results       History of Present Illness:    The patient is returns for follow up of left knee pain.  Since the prior visit, She reports minimal improvement.  She states that her pain is predominantly on the posterior medial aspect of her knee and describes it as dull and achy.  She also reports a palpable bump in the posterior aspect of her knee.  She mentions that she did ride the stationary bike last week which exacerbated her symptoms and also caused anterior knee pain.  She mentions that she did run several days later that did not cause her any pain or discomfort.  She denies any instability.  She denies any patella subluxation.  She denies any mechanical catching or locking.      Knee Surgical History:  None    Past Medical, Social and Family History:  Past Medical History:   Diagnosis Date    Asthma     exercise induced    Preeclampsia     Varicella     childhood     Past Surgical History:   Procedure Laterality Date    IR BIOPSY KIDNEY RANDOM  8/13/2024    WISDOM TOOTH EXTRACTION       Allergies   Allergen Reactions    Cat  Dander Cough, Hives, Itching, Shortness Of Breath and Wheezing     Current Outpatient Medications on File Prior to Visit   Medication Sig Dispense Refill    lisinopril (ZESTRIL) 5 mg tablet Take 0.5 tablets (2.5 mg total) by mouth daily Dont take if pregnant 90 tablet 0    Prenatal MV-Min-Fe Fum-FA-DHA (PRENATAL 1 PO) Take by mouth      sertraline (ZOLOFT) 25 mg tablet TAKE 1 TABLET BY MOUTH EVERY DAY 30 tablet 3     No current facility-administered medications on file prior to visit.     Social History     Socioeconomic History    Marital status: /Civil Union     Spouse name: Not on file    Number of children: Not on file    Years of education: Not on file    Highest education level: Not on file   Occupational History    Not on file   Tobacco Use    Smoking status: Never     Passive exposure: Never    Smokeless tobacco: Never   Vaping Use    Vaping status: Never Used   Substance and Sexual Activity    Alcohol use: Never    Drug use: Never    Sexual activity: Yes     Partners: Male     Birth control/protection: None   Other Topics Concern    Not on file   Social History Narrative    Not on file     Social Drivers of Health     Financial Resource Strain: Not on file   Food Insecurity: No Food Insecurity (2/16/2024)    Nursing - Inadequate Food Risk Classification     Worried About Running Out of Food in the Last Year: Never true     Ran Out of Food in the Last Year: Never true     Ran Out of Food in the Last Year: Not on file   Transportation Needs: No Transportation Needs (2/16/2024)    PRAPARE - Transportation     Lack of Transportation (Medical): No     Lack of Transportation (Non-Medical): No   Physical Activity: Not on file   Stress: Not on file   Social Connections: Not on file   Intimate Partner Violence: Not on file   Housing Stability: High Risk (2/16/2024)    Housing Stability Vital Sign     Unable to Pay for Housing in the Last Year: No     Number of Times Moved in the Last Year: 2     Homeless in  the Last Year: No         I have reviewed the past medical, surgical, social and family history, medications and allergies as documented in the EMR.    Review of systems: ROS is negative other than that noted in the HPI.  Constitutional: Negative for fatigue and fever.      Physical Exam:    currently breastfeeding.    General/Constitutional: NAD, well developed, well nourished  HENT: Normocephalic, atraumatic  CV: Intact distal pulses, regular rate  Resp: No respiratory distress or labored breathing  GI: Soft and non-tender   Lymphatic: No lymphadenopathy palpated  Neuro: Alert and Oriented x 3, no focal deficits  Psych: Normal mood, normal affect, normal judgement, normal behavior  Skin: Warm, dry, no rashes, no erythema      Knee Exam (focused):               RIGHT LEFT   ROM:   0-130 0-130   Palpation: Effusion negative Moderate Baker's cyst     MJL tenderness Negative Negative     LJL tenderness Negative Negative   Meniscus: Mele Negative Negative    Apley's Compression Negative Negative   Instability: Varus stable stable     Valgus stable stable   Special Tests: Lachman Negative Negative     Posterior drawer Negative Negative     Anterior drawer Negative Negative     Pivot shift not tested not tested     Dial not tested not tested   Patella: Palpation no tenderness lateral facet ttp     Mobility 1/4 1/4     Apprehension Negative Negative   Other: Single leg 1/4 squat not tested not tested           LE NV Exam: +2 DP/PT pulses bilaterally  Sensation intact to light touch L2-S1 bilaterally    No calf tenderness to palpation bilaterally      Knee Imaging    MRI of left knee was reviewed which demonstrates focal edema in the superolateral corner of Hoffa's fat pad indicative of patellofemoral tracking or impingement. Grade 4 chondrosis of the inferior lateral patellar facet is noted. I have reviewed and agree with the radiologist impression.       Scribe Attestation      I,:  Marlo Fabian am acting as a  scribe while in the presence of the attending physician.:       I,:  Ahsan Moss, DO personally performed the services described in this documentation    as scribed in my presence.:

## 2025-02-20 ENCOUNTER — TELEPHONE (OUTPATIENT)
Age: 30
End: 2025-02-20

## 2025-02-20 NOTE — TELEPHONE ENCOUNTER
Pt called in to reschedule her annual from 11/5 to 11/25. She also states she has an upcoming procedure on her knee that will be utilizing lidocaine. Wondering if it is still okay to breastfeed or if she will need to pump and dump. Encouraged her to reach out to babies pediatrician for more information. Pt thankful.

## 2025-02-26 ENCOUNTER — APPOINTMENT (OUTPATIENT)
Dept: URGENT CARE | Facility: MEDICAL CENTER | Age: 30
End: 2025-02-26
Payer: COMMERCIAL

## 2025-02-26 ENCOUNTER — OFFICE VISIT (OUTPATIENT)
Dept: URGENT CARE | Facility: MEDICAL CENTER | Age: 30
End: 2025-02-26
Payer: COMMERCIAL

## 2025-02-26 VITALS
RESPIRATION RATE: 18 BRPM | WEIGHT: 143 LBS | HEART RATE: 68 BPM | SYSTOLIC BLOOD PRESSURE: 123 MMHG | OXYGEN SATURATION: 96 % | TEMPERATURE: 98 F | BODY MASS INDEX: 22.4 KG/M2 | DIASTOLIC BLOOD PRESSURE: 75 MMHG

## 2025-02-26 DIAGNOSIS — H00.015 HORDEOLUM EXTERNUM OF LEFT LOWER EYELID: Primary | ICD-10-CM

## 2025-02-26 PROCEDURE — S9083 URGENT CARE CENTER GLOBAL: HCPCS | Performed by: PHYSICIAN ASSISTANT

## 2025-02-26 PROCEDURE — G0383 LEV 4 HOSP TYPE B ED VISIT: HCPCS | Performed by: PHYSICIAN ASSISTANT

## 2025-02-26 RX ORDER — POLYMYXIN B SULFATE AND TRIMETHOPRIM 1; 10000 MG/ML; [USP'U]/ML
1 SOLUTION OPHTHALMIC EVERY 4 HOURS
Qty: 5 ML | Refills: 0 | Status: SHIPPED | OUTPATIENT
Start: 2025-02-26 | End: 2025-03-05

## 2025-02-26 NOTE — PATIENT INSTRUCTIONS
"Hordeolum  Polytrim to left eye as directed  If no improvement within 48 hours follow up with ophthalmology  Follow up with PCP in 3-5 days.  Proceed to  ER if symptoms worsen.Patient Education     Stye   The Basics   Written by the doctors and editors at [facility]   What is a stye? -- A stye is a red and painful lump on the eyelid. It happens when a small gland on the edge of the eyelid gets infected or inflamed. Styes can form on the upper or lower eyelids. Most styes get better on their own after a few days to a week. The medical term for stye is \"hordeolum.\"  People sometimes get a stye confused with a different eye problem called a \"chalazion.\" A chalazion also causes a lump on the eyelid. But a stye is caused by an infection and is painful. A chalazion is not tender or painful, but it often lasts longer than a stye does.  What are the symptoms of a stye? -- People who have a stye have a painful lump on the edge of their eyelid (picture 1). The lump might look red, swollen, or similar to a pimple.  A stye usually develops over a few days. Styes can cause other symptoms, too, such as tearing and eyelid pain and swelling.  Is there a test for a stye? -- No. But your doctor or nurse should be able to tell if you have a stye by talking with you and doing an exam.  Is there anything I can do on my own? -- Yes:   Put a warm, wet compress on the stye. Wet a clean washcloth with warm water, and put it over your stye. When the washcloth cools, reheat it with warm water and put it back over the stye. Repeat these steps for about 15 minutes, and try to do this 4 times a day.   It might help to gently massage your eyelid.   Do not squeeze or try to pop your stye. This can make it worse.   Do not wear eye makeup or contact lenses until your stye is gone.  What treatments might my doctor use? -- If your stye doesn't get better or if it leads to other problems, your doctor might:   Prescribe a cream or ointment that goes in " the eye and on the eyelid   Prescribe antibiotic medicines   Do a procedure to drain the stye  Can styes be prevented? -- Yes. To lower your chances of getting a stye, you can:   Wash your hands often - It's especially important to wash your hands before you touch your eyes. Also, if you wear contact lenses, keep them clean and wash your hands before you put them in.   Be careful with your eye makeup - Wearing eye makeup can sometimes cause a stye. Remove your eye makeup each night, and throw away old makeup. Do not share eye makeup with other people.  When should I call the doctor? -- Call your doctor or nurse for advice if:   Your stye doesn't go away after using warm compresses for 1 to 2 weeks.   Your stye gets very big, bleeds, or affects your vision.   Your whole eye is red, or your whole eyelid is red or swollen.   The redness or swelling spreads to your cheek or other parts of your face.  All topics are updated as new evidence becomes available and our peer review process is complete.  This topic retrieved from [employer] on: Feb 26, 2024.  Topic 69002 Version 17.0  Release: 32.2.4 - C32.56  © 2024 UpToDate, Inc. and/or its affiliates. All rights reserved.  picture 1: Stye     This person has a stye on the lower eyelid.  Graphic 46837 Version 2.0  Consumer Information Use and Disclaimer   Disclaimer: This generalized information is a limited summary of diagnosis, treatment, and/or medication information. It is not meant to be comprehensive and should be used as a tool to help the user understand and/or assess potential diagnostic and treatment options. It does NOT include all information about conditions, treatments, medications, side effects, or risks that may apply to a specific patient. It is not intended to be medical advice or a substitute for the medical advice, diagnosis, or treatment of a health care provider based on the health care provider's examination and assessment of a patient's specific and  unique circumstances. Patients must speak with a health care provider for complete information about their health, medical questions, and treatment options, including any risks or benefits regarding use of medications. This information does not endorse any treatments or medications as safe, effective, or approved for treating a specific patient. UpToDate, Inc. and its affiliates disclaim any warranty or liability relating to this information or the use thereof.The use of this information is governed by the Terms of Use, available at https://www.woltersSageFireuwer.com/en/know/clinical-effectiveness-terms. 2024© UpToDate, Inc. and its affiliates and/or licensors. All rights reserved.  Copyright   © 2024 UpToDate, Inc. and/or its affiliates. All rights reserved.

## 2025-02-26 NOTE — PROGRESS NOTES
[facility]        NAME: Mariela Mtz is a 29 y.o. female  : 1995    MRN: [id number]  DATE: 2025  TIME: 9:02 AM    Assessment and Plan   Hordeolum externum of left lower eyelid [H00.015]  1. Hordeolum externum of left lower eyelid  polymyxin b-trimethoprim (POLYTRIM) ophthalmic solution            Patient Instructions     Hordeolum  Polytrim to left eye as directed  If no improvement within 48 hours follow up with ophthalmology  Follow up with PCP in 3-5 days.  Proceed to  ER if symptoms worsen.    Chief Complaint     Chief Complaint   Patient presents with   • Eye Pain     Pt. With pain and redness to her left eye that began 5 days ago after blowing her nose.          History of Present Illness       29-year-old female who presents complaining of left eye pain.  Patient states that it feels like there is a foreign body on the lower eyelid.  Denies history of trauma, discharge.    Eye Pain       Review of Systems   Review of Systems   Eyes:  Positive for pain.         Current Medications       Current Outpatient Medications:   •  lisinopril (ZESTRIL) 5 mg tablet, Take 0.5 tablets (2.5 mg total) by mouth daily Dont take if pregnant, Disp: 90 tablet, Rfl: 0  •  polymyxin b-trimethoprim (POLYTRIM) ophthalmic solution, Administer 1 drop into the left eye every 4 (four) hours for 7 days, Disp: 5 mL, Rfl: 0  •  Prenatal MV-Min-Fe Fum-FA-DHA (PRENATAL 1 PO), Take by mouth, Disp: , Rfl:   •  sertraline (ZOLOFT) 25 mg tablet, TAKE 1 TABLET BY MOUTH EVERY DAY, Disp: 30 tablet, Rfl: 3    Current Allergies     Allergies as of 2025 - Reviewed 2025   Allergen Reaction Noted   • Cat dander Cough, Hives, Itching, Shortness Of Breath, and Wheezing 2023   • Latex Hives and Itching 2025            The following portions of the patient's history were reviewed and updated as appropriate: allergies, current medications, past family history, past medical history, past social history,  past surgical history and problem list.     Past Medical History:   Diagnosis Date   • Asthma     exercise induced   • Preeclampsia    • Varicella     childhood       Past Surgical History:   Procedure Laterality Date   • IR BIOPSY KIDNEY RANDOM  8/13/2024   • WISDOM TOOTH EXTRACTION         Family History   Problem Relation Age of Onset   • Miscarriages / Stillbirths Mother         1   • No Known Problems Father    • No Known Problems Sister    • Diabetes type II Maternal Grandmother    • Diabetes Maternal Grandmother         Type 2   • Skin cancer Maternal Grandfather    • Prostate cancer Maternal Grandfather    • No Known Problems Paternal Grandmother    • Emphysema Paternal Grandfather    • COPD Paternal Grandfather          Medications have been verified.        Objective   /75   Pulse 68   Temp 98 °F (36.7 °C)   Resp 18   Wt 64.9 kg (143 lb)   SpO2 96%   BMI 22.40 kg/m²        Physical Exam     Physical Exam  Constitutional:       Appearance: Normal appearance. She is well-developed.   HENT:      Head: Normocephalic and atraumatic.      Right Ear: Tympanic membrane, ear canal and external ear normal.      Left Ear: Tympanic membrane, ear canal and external ear normal.   Eyes:      General: Lids are normal. Vision grossly intact. Gaze aligned appropriately. No allergic shiner, visual field deficit or scleral icterus.     Extraocular Movements: Extraocular movements intact.      Conjunctiva/sclera: Conjunctivae normal.     Cardiovascular:      Rate and Rhythm: Normal rate and regular rhythm.      Heart sounds: Normal heart sounds.   Pulmonary:      Effort: Pulmonary effort is normal. No respiratory distress.      Breath sounds: Normal breath sounds. No wheezing or rales.   Chest:      Chest wall: No tenderness.   Musculoskeletal:      Cervical back: Normal range of motion and neck supple.   Lymphadenopathy:      Cervical: No cervical adenopathy.   Neurological:      Mental Status: She is alert.

## 2025-02-27 ENCOUNTER — HOSPITAL ENCOUNTER (OUTPATIENT)
Dept: ULTRASOUND IMAGING | Facility: HOSPITAL | Age: 30
Discharge: HOME | End: 2025-02-27
Attending: ORTHOPAEDIC SURGERY
Payer: COMMERCIAL

## 2025-02-27 DIAGNOSIS — M71.22 BAKER'S CYST OF KNEE, LEFT: ICD-10-CM

## 2025-02-27 PROCEDURE — 76942 ECHO GUIDE FOR BIOPSY: CPT

## 2025-02-27 PROCEDURE — 20610 DRAIN/INJ JOINT/BURSA W/O US: CPT

## 2025-03-03 RX ORDER — SERTRALINE HYDROCHLORIDE 25 MG/1
25 TABLET, FILM COATED ORAL DAILY
Qty: 30 TABLET | Refills: 5 | Status: SHIPPED | OUTPATIENT
Start: 2025-03-03

## 2025-03-26 ENCOUNTER — OFFICE VISIT (OUTPATIENT)
Dept: OBGYN CLINIC | Facility: MEDICAL CENTER | Age: 30
End: 2025-03-26
Payer: COMMERCIAL

## 2025-03-26 VITALS — WEIGHT: 143.4 LBS | HEIGHT: 67 IN | BODY MASS INDEX: 22.51 KG/M2

## 2025-03-26 DIAGNOSIS — M71.22 BAKER'S CYST OF KNEE, LEFT: Primary | ICD-10-CM

## 2025-03-26 PROCEDURE — 99213 OFFICE O/P EST LOW 20 MIN: CPT | Performed by: ORTHOPAEDIC SURGERY

## 2025-03-26 NOTE — PROGRESS NOTES
Orthopedics Sports Medicine Knee Follow Up Visit    Name: Mariela Mtz      : 1995      MRN: [id number]  Encounter Provider: Ahsan Moss DO  Encounter Date: 3/26/2025   Encounter department: [facility] [place]  :  Assessment & Plan  Baker's cyst of knee, left    Orders:    Ambulatory Referral to Orthopedic Surgery; Future           Assesment:     29 y.o. female eft knee symptomatic baker's cyst and grade 4 degenerative changes of lateral patella facet     Plan:      Conservative treatment:    Continue home exercises working on strengthening to knee, hip and core.  OTC NSAIDS prn for pain.  Let pain guide gradual return activities.    Will be referring patient to Dr. Dsouza to discussed open resection baker's cyst excision.  I also discussed arthroscopic decompression of the Baker's cyst, but explained this would not fully remove the cyst and it could reaccumulate over time.  I would be happy to pursue this route with her if she wants to go that route but she wants to consider definitive surgical resection as an alternative as well, which I think is a good option and may be more likely to definitively solve the bakers cyst discomfort permanently.  I also explained that doing a Baker's cyst surgery would not take away any pain or crepitation from her patellofemoral osteoarthritis.      Imaging:    All imaging from today was reviewed by myself and explained to the patient.       Injection:    May consider future corticosteroid injection depending on clinical exam/imaging.  May consider future viscosupplementation injection depending on clinical exam/imaging.      Surgery:     Discussed with patient since she had no significant improvement from the ultrasound-guided aspiration may consider open resection baker's cyst vs arthroscopic resection of Ramos's cyst.       Follow up:    Return if symptoms worsen or fail to improve.      History of Present Illness   HPI  Chief  Complaint   Patient presents with    Left Knee - Follow-up       History of Present Illness:    The patient is returns for follow up of her left knee. Erika.  Since the prior visit, She reports no improvement. Patient did have US guided left knee aspiration on 2/27/25 and had one week relief and now baker's cyst has returned. She states she is having more  locking and clicking in the left knee. She does have  palpable heath posterior aspect of the left knee. She denies any instability. She denies any patella subluxation.         Knee Surgical History:  None    Past Medical, Social and Family History:  Past Medical History:   Diagnosis Date    Asthma     exercise induced    Preeclampsia     Varicella     childhood     Past Surgical History:   Procedure Laterality Date    IR BIOPSY KIDNEY RANDOM  8/13/2024    WISDOM TOOTH EXTRACTION       Allergies   Allergen Reactions    Cat Dander Cough, Hives, Itching, Shortness Of Breath and Wheezing    Latex Hives and Itching     Current Outpatient Medications on File Prior to Visit   Medication Sig Dispense Refill    lisinopril (ZESTRIL) 5 mg tablet Take 0.5 tablets (2.5 mg total) by mouth daily Dont take if pregnant 90 tablet 0    Prenatal MV-Min-Fe Fum-FA-DHA (PRENATAL 1 PO) Take by mouth      sertraline (ZOLOFT) 25 mg tablet TAKE 1 TABLET BY MOUTH EVERY DAY 30 tablet 5     No current facility-administered medications on file prior to visit.     Social History     Socioeconomic History    Marital status: /Civil Union     Spouse name: Not on file    Number of children: Not on file    Years of education: Not on file    Highest education level: Not on file   Occupational History    Not on file   Tobacco Use    Smoking status: Never     Passive exposure: Never    Smokeless tobacco: Never   Vaping Use    Vaping status: Never Used   Substance and Sexual Activity    Alcohol use: Never    Drug use: Never    Sexual activity: Yes     Partners: Male     Birth control/protection:  "None   Other Topics Concern    Not on file   Social History Narrative    Not on file     Social Drivers of Health     Financial Resource Strain: Not on file   Food Insecurity: No Food Insecurity (2/16/2024)    Nursing - Inadequate Food Risk Classification     Worried About Running Out of Food in the Last Year: Never true     Ran Out of Food in the Last Year: Never true     Ran Out of Food in the Last Year: Not on file   Transportation Needs: No Transportation Needs (2/16/2024)    PRAPARE - Transportation     Lack of Transportation (Medical): No     Lack of Transportation (Non-Medical): No   Physical Activity: Not on file   Stress: Not on file   Social Connections: Not on file   Intimate Partner Violence: Not on file   Housing Stability: High Risk (2/16/2024)    Housing Stability Vital Sign     Unable to Pay for Housing in the Last Year: No     Number of Times Moved in the Last Year: 2     Homeless in the Last Year: No         I have reviewed the past medical, surgical, social and family history, medications and allergies as documented in the EMR.    Review of systems: ROS is negative other than that noted in the HPI.  Constitutional: Negative for fatigue and fever.     Objective   Ht 5' 7\" (1.702 m)   Wt 65 kg (143 lb 6.4 oz)   BMI 22.46 kg/m²      Physical Exam:    Height 5' 7\" (1.702 m), weight 65 kg (143 lb 6.4 oz), currently breastfeeding.    General/Constitutional: NAD, well developed, well nourished  HENT: Normocephalic, atraumatic  CV: Intact distal pulses, regular rate  Resp: No respiratory distress or labored breathing  GI: Soft and non-tender   Lymphatic: No lymphadenopathy palpated  Neuro: Alert and Oriented x 3, no focal deficits  Psych: Normal mood, normal affect, normal judgement, normal behavior  Skin: Warm, dry, no rashes, no erythema      Knee Exam (focused):               RIGHT LEFT   ROM:   0-130 0-130   Palpation: Effusion negative Baker's cyst      MJL tenderness Negative Negative     LJL " tenderness Negative Negative   Meniscus: Mele Negative Negative    Apley's Compression Negative Negative   Instability: Varus stable stable     Valgus stable stable   Special Tests: Lachman Negative Negative     Posterior drawer Negative Negative     Anterior drawer Negative Negative     Pivot shift not tested not tested     Dial not tested not tested   Patella: Palpation no tenderness no tenderness     Mobility 1/4 1/4     Apprehension Negative Negative   Other: Single leg 1/4 squat not tested not tested           LE NV Exam: +2 DP/PT pulses bilaterally  Sensation intact to light touch L2-S1 bilaterally    No calf tenderness to palpation bilaterally      Knee Imaging    No imaging was performed today      Scribe Attestation      I,:  Nohemy Hardwick MA am acting as a scribe while in the presence of the attending physician.:       I,:  Ahsan Moss DO personally performed the services described in this documentation    as scribed in my presence.:

## 2025-03-27 ENCOUNTER — OFFICE VISIT (OUTPATIENT)
Dept: OBGYN CLINIC | Facility: MEDICAL CENTER | Age: 30
End: 2025-03-27
Payer: COMMERCIAL

## 2025-03-27 VITALS — HEIGHT: 67 IN | BODY MASS INDEX: 22.44 KG/M2 | WEIGHT: 143 LBS

## 2025-03-27 DIAGNOSIS — M71.22 BAKER'S CYST OF KNEE, LEFT: ICD-10-CM

## 2025-03-27 PROCEDURE — 99214 OFFICE O/P EST MOD 30 MIN: CPT | Performed by: STUDENT IN AN ORGANIZED HEALTH CARE EDUCATION/TRAINING PROGRAM

## 2025-03-27 NOTE — PROGRESS NOTES
Orthopedic Surgery Office Note  Mariela Mtz (29 y.o. female)   : 1995   MRN: [id number]   Encounter Date: 3/27/2025 with Dr. Pj Dsouza, DO  Encounter department: [facility] [place]  Chief Complaint   Patient presents with    Left Knee - Pain       Assessment, Plan, & Discussion:     :  Assessment & Plan  Baker's cyst of knee, left  S/p ultrasound  guided baker's cyst aspiration with 16 mL yellow fluid aspirated. 1 month prior   Re-accumulation of about 50% since aspiration   No major pain/ discomfort or affect on movement / daily activities/ exercise      Plan:   Reassurance   Discussed risk and benefits of surgical intervention to resect baker's cyst   Educated that the cyst is related to underlying arthritis and is likely to come back even with surgery or other management   FU as needed, if choosing to pursue surgical intervention   Discussed in depth that I am not concerned that this is a worrisome lesion, it has been aspirated this is likely just a normal Baker's cyst.  Patient states that she has no actual pain but she does notice that it is there and creates some odd feelings of marginal discomfort when she is squatting.  But she is able to play soccer and exercise with normal activity without any discomfort.  Discussed that I could possibly remove this surgically in an open manner, but this has no guarantee that would decrease any of her symptoms, especially since she is not having any pain.  Also discussed that the inciting event, her patellofemoral arthritis, is the reason for this cystic structure to be created.  Surgical resection may not cure any of her discomfort, and may cause increased discomfort including scar issues numbness tingling etc.  She will monitor over time, if she wants me to remove this in the future I certainly will, especially if it increases in size and creates issues.    Orders:    Ambulatory Referral to Orthopedic Surgery          No  "follow-ups on file.      Surgery:   No surgery planned at this time      Orders:     Diagnoses and all orders for this visit:    Baker's cyst of knee, left  -     Ambulatory Referral to Orthopedic Surgery         History of Present Illness:     Mariela Mtz is a 29 y.o. female who presents for consultation at the request of Ahsan Moss DO  regarding Left sided baker cyst which has been present for the past few months and growing significantly. Due to growth patient sought treatment.     Patient has aspiration of cyst 1 month prior, without good effect. Has re-accumulated about 50% since procedure. No pain or effect on daily activities. Would like education/ opinion on necessity of surgical intervention.     Review of Systems  Constitutional: Negative for fatigue, fever or loss of appetite.   HENT: Negative.    Respiratory: Negative for shortness of breath, dyspnea.    Cardiovascular: Negative for chest pain/tightness.   Gastrointestinal: Negative for abdominal pain, N/V.   Endocrine: Negative for cold/heat intolerance, unexplained weight loss/gain.   Genitourinary: Negative for flank pain, dysuria  Skin: Negative for rash.    Psychiatric/Behavioral: Negative for agitation.  All else negative unless otherwise noted in HPI    Physical Exam:   General:  Ht 5' 7\" (1.702 m)   Wt 64.9 kg (143 lb)   BMI 22.40 kg/m²   Cons: Appears well.  No apparent distress.  Psych: Alert. Oriented x3.  Mood and affect normal.  Eyes: PERRLA, EOMI  Resp: Normal effort.  No audible wheezing or stridor.  CV: Extremities warm and well perfused.   Abd:    No distention or guarding.   Skin: Warm. No visible lesions.  Neuro: Normal muscle tone.      Orthopedic Exam:   Left Knee Exam  Alignment:  Normal knee alignment.  Inspection:   posterior L knee  swelling.  Palpation:  No tenderness.  ROM:  Normal knee ROM.  Strength:  5/5 quadriceps and hamstrings.  Stability:  No objective knee instability. Stable Varus / Valgus stress, Lachman, and " Posterior drawer.  Tests:  (-) Mele.  Patella:  Normal patellar mobility.  Neurovascular:   sensation intact in Lower Ex    2+ DP & PT pulses.  Gait:  Normal.       Imaging/Studies:     Study: MRI knee left wo contrast   Date: 12/30/2024  Report: I have read and agree with the radiologist report.  MRI LEFT KNEE     INDICATION:   M71.22: Synovial cyst of popliteal space (Baker), left knee  M76.52: Patellar tendinitis, left knee.     COMPARISON: 12/10/2024     TECHNIQUE:    Multiplanar/multisequence MR of the left knee was performed.        FINDINGS:     SUBCUTANEOUS TISSUES: Normal     JOINT EFFUSION: None.     BAKER'S CYST: Large popliteal cyst with synovitis     MENISCI: Intact.     CRUCIATE LIGAMENTS: Intact.     EXTENSOR APPARATUS: There is focal edema in the superolateral corner of Hoffa's fat pad, which may indicate patellofemoral tracking or impingement abnormality (Patellar Tendon-Lateral Femoral Condyle Friction Syndrome (PT-LFCFS). Lateral patellar tilt     COLLATERAL LIGAMENTS: Intact.     ARTICULAR SURFACES: Grade 4 chondrosis inferior half of the lateral patellar facet.     BONES: No acute fracture or marrow infiltrative process.     MUSCULATURE:  Intact.     IMPRESSION:     Large popliteal cyst with synovitis.     There is focal edema in the superolateral corner of Hoffa's fat pad, which may indicate patellofemoral tracking or impingement abnormality (Patellar Tendon-Lateral Femoral Condyle Friction Syndrome (PT-LFCFS). Lateral patellar tilt     Grade 4 chondrosis inferior half lateral patellar facet.       Procedures  No procedures today.      Medical, Surgical, Family, and Social History    The patient's medical history, family history, and social history, were reviewed and updated as appropriate.    Past Medical History:   Diagnosis Date    Asthma     exercise induced    Preeclampsia     Varicella     childhood     Past Surgical History:   Procedure Laterality Date    IR BIOPSY KIDNEY RANDOM   "8/13/2024    WISDOM TOOTH EXTRACTION       Family History   Problem Relation Age of Onset    Miscarriages / Stillbirths Mother         1    No Known Problems Father     No Known Problems Sister     Diabetes type II Maternal Grandmother     Diabetes Maternal Grandmother         Type 2    Skin cancer Maternal Grandfather     Prostate cancer Maternal Grandfather     No Known Problems Paternal Grandmother     Emphysema Paternal Grandfather     COPD Paternal Grandfather      Social History     Occupational History    Not on file   Tobacco Use    Smoking status: Never     Passive exposure: Never    Smokeless tobacco: Never   Vaping Use    Vaping status: Never Used   Substance and Sexual Activity    Alcohol use: Never    Drug use: Never    Sexual activity: Yes     Partners: Male     Birth control/protection: None     Allergies   Allergen Reactions    Cat Dander Cough, Hives, Itching, Shortness Of Breath and Wheezing    Latex Hives and Itching       Current Outpatient Medications:     lisinopril (ZESTRIL) 5 mg tablet, Take 0.5 tablets (2.5 mg total) by mouth daily Dont take if pregnant, Disp: 90 tablet, Rfl: 0    Prenatal MV-Min-Fe Fum-FA-DHA (PRENATAL 1 PO), Take by mouth, Disp: , Rfl:     sertraline (ZOLOFT) 25 mg tablet, TAKE 1 TABLET BY MOUTH EVERY DAY, Disp: 30 tablet, Rfl: 5    Estimated body mass index is 22.4 kg/m² as calculated from the following:    Height as of this encounter: 5' 7\" (1.702 m).    Weight as of this encounter: 64.9 kg (143 lb).    Laboratory Findings:     Lab Results   Component Value Date/Time    HGB 13.6 08/13/2024 09:24 AM    ALB 4.5 07/20/2024 09:52 AM         This Visit:           Scribe Attestation      I,:   am acting as a scribe while in the presence of the attending physician.:       I,:   personally performed the services described in this documentation    as scribed in my presence.:             Dr. Pj Dsouza, DO, Orthopedic Surgeon  Orthopedic Oncology & [facility]    "

## 2025-04-02 ENCOUNTER — TELEPHONE (OUTPATIENT)
Dept: NEPHROLOGY | Facility: HOSPITAL | Age: 30
End: 2025-04-02

## 2025-04-02 NOTE — TELEPHONE ENCOUNTER
LVM for the patient reminding to please complete blood and urine test  prior to 4/10 appointment with Dr. Reyes. Advised patient to call back with any questions or  Concerns.

## 2025-04-04 NOTE — TELEPHONE ENCOUNTER
Called patient  reminding to please complete blood and urine test  prior to 4/10 appointment with Dr. Reyes.

## 2025-05-21 ENCOUNTER — TELEPHONE (OUTPATIENT)
Dept: NEPHROLOGY | Facility: CLINIC | Age: 30
End: 2025-05-21

## 2025-05-21 NOTE — TELEPHONE ENCOUNTER
LVM to patient reminding to please complete  blood and urine test  prior to 5/29 appointment with Dr. Reyes Advised patient to call back with any questions or  Concerns.

## 2025-05-28 ENCOUNTER — APPOINTMENT (OUTPATIENT)
Dept: LAB | Facility: CLINIC | Age: 30
End: 2025-05-28
Attending: STUDENT IN AN ORGANIZED HEALTH CARE EDUCATION/TRAINING PROGRAM
Payer: COMMERCIAL

## 2025-05-28 DIAGNOSIS — N02.B9 IGA NEPHROPATHY: ICD-10-CM

## 2025-05-28 DIAGNOSIS — R80.1 PERSISTENT PROTEINURIA: ICD-10-CM

## 2025-05-28 LAB
ANION GAP SERPL CALCULATED.3IONS-SCNC: 6 MMOL/L (ref 4–13)
BACTERIA UR QL AUTO: ABNORMAL /HPF
BILIRUB UR QL STRIP: NEGATIVE
BUN SERPL-MCNC: 26 MG/DL (ref 5–25)
CALCIUM SERPL-MCNC: 9.3 MG/DL (ref 8.4–10.2)
CHLORIDE SERPL-SCNC: 102 MMOL/L (ref 96–108)
CLARITY UR: CLEAR
CO2 SERPL-SCNC: 31 MMOL/L (ref 21–32)
COLOR UR: COLORLESS
CREAT SERPL-MCNC: 0.7 MG/DL (ref 0.6–1.3)
CREAT UR-MCNC: 21.5 MG/DL
GFR SERPL CREATININE-BSD FRML MDRD: 115 ML/MIN/1.73SQ M
GLUCOSE SERPL-MCNC: 102 MG/DL (ref 65–140)
GLUCOSE UR STRIP-MCNC: NEGATIVE MG/DL
HGB UR QL STRIP.AUTO: ABNORMAL
KETONES UR STRIP-MCNC: NEGATIVE MG/DL
LEUKOCYTE ESTERASE UR QL STRIP: NEGATIVE
MICROALBUMIN UR-MCNC: 89.5 MG/L
MICROALBUMIN/CREAT 24H UR: 416 MG/G CREATININE (ref 0–30)
NITRITE UR QL STRIP: NEGATIVE
NON-SQ EPI CELLS URNS QL MICRO: ABNORMAL /HPF
PH UR STRIP.AUTO: 6.5 [PH]
POTASSIUM SERPL-SCNC: 3.8 MMOL/L (ref 3.5–5.3)
PROT UR STRIP-MCNC: NEGATIVE MG/DL
RBC #/AREA URNS AUTO: ABNORMAL /HPF
SODIUM SERPL-SCNC: 139 MMOL/L (ref 135–147)
SP GR UR STRIP.AUTO: 1.01 (ref 1–1.03)
UROBILINOGEN UR STRIP-ACNC: <2 MG/DL
WBC #/AREA URNS AUTO: ABNORMAL /HPF

## 2025-05-28 PROCEDURE — 82570 ASSAY OF URINE CREATININE: CPT

## 2025-05-28 PROCEDURE — 36415 COLL VENOUS BLD VENIPUNCTURE: CPT

## 2025-05-28 PROCEDURE — 81001 URINALYSIS AUTO W/SCOPE: CPT

## 2025-05-28 PROCEDURE — 82043 UR ALBUMIN QUANTITATIVE: CPT

## 2025-05-28 PROCEDURE — 80048 BASIC METABOLIC PNL TOTAL CA: CPT

## 2025-05-29 ENCOUNTER — OFFICE VISIT (OUTPATIENT)
Dept: NEPHROLOGY | Facility: CLINIC | Age: 30
End: 2025-05-29
Payer: COMMERCIAL

## 2025-05-29 VITALS
DIASTOLIC BLOOD PRESSURE: 72 MMHG | WEIGHT: 142 LBS | SYSTOLIC BLOOD PRESSURE: 110 MMHG | BODY MASS INDEX: 22.29 KG/M2 | OXYGEN SATURATION: 98 % | HEIGHT: 67 IN | HEART RATE: 79 BPM

## 2025-05-29 DIAGNOSIS — R80.1 PERSISTENT PROTEINURIA: ICD-10-CM

## 2025-05-29 DIAGNOSIS — N02.B9 IGA NEPHROPATHY: Primary | ICD-10-CM

## 2025-05-29 PROCEDURE — 99214 OFFICE O/P EST MOD 30 MIN: CPT | Performed by: STUDENT IN AN ORGANIZED HEALTH CARE EDUCATION/TRAINING PROGRAM

## 2025-05-29 RX ORDER — LISINOPRIL 5 MG/1
2.5 TABLET ORAL DAILY
Qty: 90 TABLET | Refills: 2 | Status: SHIPPED | OUTPATIENT
Start: 2025-05-29

## 2025-05-29 NOTE — PROGRESS NOTES
Name: Mariela Mtz      : 1995      MRN: [id number]  Encounter Provider: Joselyn Reyes Bahamonde, MD  Encounter Date: 2025   Encounter department: [facility] [place]  :  Assessment & Plan  IgA nephropathy  Diagnosed in 2024  Biopsy sent to [facility]  78 glomeruli  6 with global glomerulosclerosis  Minimal interstitial fibrosis and tubular atrophy  M1 E0 S1 T0C0, mild disease  Continue with lisinopril 2.5 mg, unable to tolerate higher dose due to dizziness and hypotension  Proteinuria to 416mg/g,   Patient's BP is low , she only tolerates 2.5mg of Lisinopril   Plan to start SGLT2 inhibitors when she is done with pregnancies and breastfeeding   Orders:    lisinopril (ZESTRIL) 5 mg tablet; Take 0.5 tablets (2.5 mg total) by mouth daily Dont take if pregnant    Hypertension in pregnancy, preeclampsia, delivered  Resolved   Patient is thinking of having a 3rd baby next year   We discussed pregnancy health, outcomes and complication such as preeclampsia   Overall she is in a good position to get pregnancy   Persistent proteinuria  Uacr 450mg/g with out hematuria   If no improvement will start SGLT2 I once she decides if she wants to get pregnant again   No indication of Budesonide or complement inhibitors at [facility] time            History of Present Illness   HPI  Mariela Mtz is a 29 y.o. female PMH of preeclampsia with her first pregnancy with proteinuria never check in between pregnancies.  Just had a baby 3 months ago she did not develop her eclampsia but developed lower extremity edema.  With proteinuria of 450 mg.  Patient is here for follow-up of IgA   History obtained from: patient    Review of Systems   Constitutional:  Negative for activity change and appetite change.   HENT:  Negative for congestion and dental problem.    Eyes:  Negative for discharge.   Respiratory:  Negative for shortness of breath.    Cardiovascular:  Negative for chest pain and leg swelling.    Gastrointestinal:  Negative for abdominal distention and abdominal pain.   Endocrine: Negative for cold intolerance.   Musculoskeletal:  Negative for arthralgias.   Skin:  Negative for color change and pallor.   Neurological:  Negative for dizziness.   Psychiatric/Behavioral:  Negative for agitation.      Pertinent Medical History         Medical History Reviewed by provider this encounter:     .  Medications Ordered Prior to Encounter[1]   Social History[2]     Objective   There were no vitals taken for this visit.     Physical Exam  General:  no acute distress at this time  Skin:  No acute rash  Eyes:  No scleral icterus and noninjected  ENT:  mucous membranes moist  Neck:  no carotid bruits  Chest:  Clear to auscultation percussion, good respiratory effort, no use of accessory respiratory muscles  CVS:  Regular rate and rhythm without rub   Abdomen:  soft and nontender   Extremities: no significant lower extremity edema  Neuro:  No gross focality  Psych:  Alert , cooperative              [1]   Current Outpatient Medications on File Prior to Visit   Medication Sig Dispense Refill    lisinopril (ZESTRIL) 5 mg tablet Take 0.5 tablets (2.5 mg total) by mouth daily Dont take if pregnant 90 tablet 0    Prenatal MV-Min-Fe Fum-FA-DHA (PRENATAL 1 PO) Take by mouth      sertraline (ZOLOFT) 25 mg tablet TAKE 1 TABLET BY MOUTH EVERY DAY 30 tablet 5     No current facility-administered medications on file prior to visit.   [2]   Social History  Tobacco Use    Smoking status: Never     Passive exposure: Never    Smokeless tobacco: Never   Vaping Use    Vaping status: Never Used   Substance and Sexual Activity    Alcohol use: Never    Drug use: Never    Sexual activity: Yes     Partners: Male     Birth control/protection: None

## 2025-05-29 NOTE — ASSESSMENT & PLAN NOTE
Diagnosed in August 2024  Biopsy sent to [facility]  78 glomeruli  6 with global glomerulosclerosis  Minimal interstitial fibrosis and tubular atrophy  M1 E0 S1 T0C0, mild disease  Continue with lisinopril 2.5 mg, unable to tolerate higher dose due to dizziness and hypotension  Proteinuria to 416mg/g,   Patient's BP is low , she only tolerates 2.5mg of Lisinopril   Plan to start SGLT2 inhibitors when she is done with pregnancies and breastfeeding   Orders:    lisinopril (ZESTRIL) 5 mg tablet; Take 0.5 tablets (2.5 mg total) by mouth daily Dont take if pregnant

## 2025-05-29 NOTE — ASSESSMENT & PLAN NOTE
Uacr 450mg/g with out hematuria   If no improvement will start SGLT2 I once she decides if she wants to get pregnant again   No indication of Budesonide or complement inhibitors at thsi time

## 2025-05-29 NOTE — ASSESSMENT & PLAN NOTE
Resolved   Patient is thinking of having a 3rd baby next year   We discussed pregnancy health, outcomes and complication such as preeclampsia   Overall she is in a good position to get pregnancy

## 2025-05-29 NOTE — PATIENT INSTRUCTIONS
Thank you for coming to your visit today. As we discussed you kidney function is normal, your creatinine is 0.7mg/dL Your electrolytes are normal. Please follow the recommendations below       Recommend low sodium (salt) food    Avoid nonsteroidal anti-inflammatory drugs such as Naprosyn, ibuprofen, Aleve, Advil, Celebrex, Meloxicam (Mobic) etc.  You can use Tylenol as needed if you do not have any liver condition to be concerned about    Try to exercise at least 30 minutes 3 days a week to begin with with an ultimate goal of 5 days a week for at least 30 minutes      Next Visit in 8 months with results   If you need to see us earlier we can change the appointment for you      Joselyn Reyes Bahamonde, MD  Nephrology Attending

## 2025-06-16 ENCOUNTER — TELEPHONE (OUTPATIENT)
Dept: DERMATOLOGY | Facility: CLINIC | Age: 30
End: 2025-06-16

## 2025-06-16 NOTE — TELEPHONE ENCOUNTER
mole on back///R/S from Dr Geronimo, LVM w/Pt & advised of new date and to callback to confirm or R/S

## 2025-07-03 ENCOUNTER — OFFICE VISIT (OUTPATIENT)
Dept: OBGYN CLINIC | Facility: MEDICAL CENTER | Age: 30
End: 2025-07-03
Payer: COMMERCIAL

## 2025-07-03 VITALS — BODY MASS INDEX: 20.09 KG/M2 | WEIGHT: 128 LBS | HEIGHT: 67 IN

## 2025-07-03 DIAGNOSIS — M71.22 BAKER'S CYST OF KNEE, LEFT: Primary | ICD-10-CM

## 2025-07-03 PROCEDURE — 99213 OFFICE O/P EST LOW 20 MIN: CPT | Performed by: ORTHOPAEDIC SURGERY

## 2025-07-03 NOTE — PROGRESS NOTES
Orthopedics Sports Medicine Knee Follow Up Visit    Name: Mariela Mtz      : 1995      MRN: [id number]  Encounter Provider: Ahsan Moss DO  Encounter Date: 7/3/2025   Encounter department[facility]  :  Assessment & Plan  Baker's cyst of knee, left                Assesment:     29 y.o. female eft knee symptomatic baker's cyst and grade 4 degenerative changes of lateral patella facet     Plan:      Continue home exercises working on strengthening to knee, hip and core.  OTC NSAIDS prn for pain.  Let pain guide gradual return activities.    Will be referring patient back to Dr. Dsouza to discussed open resection baker's cyst excision once again as she is looking to have this procedure performed.  I also discussed arthroscopic decompression of the Baker's cyst, but explained this would not fully remove the cyst and it could reaccumulate over time.  If Dr. Dsouza feels that would be better for step I am happy to see her back.  I also explained that doing a Baker's cyst surgery would not take away any pain or crepitation from her patellofemoral osteoarthritis.        History of Present Illness   HPI  Chief Complaint   Patient presents with    Left Knee - Follow-up, Swelling, Locking     Pt notes still wanting to confirm Ajay's input. Notes return to regular exercising activity.Slight swelling has decreased since aspiration months ago. ROM can lockup with slight pain. Tylenol as needed for relief. Normatec sleeves.       History of Present Illness:    The patient is returns for follow up of her left knee. Erika.  Since the prior visit, She reports no improvement. Patient did have US guided left knee aspiration on 25 and had one week relief and now baker's cyst has returned. She states she is having more  locking and clicking in the left knee. She does have  palpable heath posterior aspect of the left knee. She denies any instability. She denies any patella  subluxation.     She notes no significant change in her symptoms.  She feels as though the Baker's cyst is getting larger.  She notes most of her pain is posterior and at the site of Baker's cyst prominence.  No instability or other complaints at this time.      Knee Surgical History:  None    Past Medical, Social and Family History:  Past Medical History:   Diagnosis Date    Asthma     exercise induced    Preeclampsia     Varicella     childhood     Past Surgical History:   Procedure Laterality Date    IR BIOPSY KIDNEY RANDOM  8/13/2024    WISDOM TOOTH EXTRACTION       Allergies   Allergen Reactions    Cat Dander Cough, Hives, Itching, Shortness Of Breath and Wheezing    Latex Hives and Itching     Current Outpatient Medications on File Prior to Visit   Medication Sig Dispense Refill    lisinopril (ZESTRIL) 5 mg tablet Take 0.5 tablets (2.5 mg total) by mouth daily Dont take if pregnant 90 tablet 2    Prenatal MV-Min-Fe Fum-FA-DHA (PRENATAL 1 PO) Take by mouth      sertraline (ZOLOFT) 25 mg tablet TAKE 1 TABLET BY MOUTH EVERY DAY 30 tablet 5     No current facility-administered medications on file prior to visit.     Social History     Socioeconomic History    Marital status: /Civil Union     Spouse name: Not on file    Number of children: Not on file    Years of education: Not on file    Highest education level: Not on file   Occupational History    Not on file   Tobacco Use    Smoking status: Never     Passive exposure: Never    Smokeless tobacco: Never   Vaping Use    Vaping status: Never Used   Substance and Sexual Activity    Alcohol use: Never    Drug use: Never    Sexual activity: Yes     Partners: Male     Birth control/protection: None   Other Topics Concern    Not on file   Social History Narrative    Not on file     Social Drivers of Health     Financial Resource Strain: Not on file   Food Insecurity: No Food Insecurity (2/16/2024)    Nursing - Inadequate Food Risk Classification     Worried About  "Running Out of Food in the Last Year: Never true     Ran Out of Food in the Last Year: Never true     Ran Out of Food in the Last Year: Not on file   Transportation Needs: No Transportation Needs (2/16/2024)    PRAPARE - Transportation     Lack of Transportation (Medical): No     Lack of Transportation (Non-Medical): No   Physical Activity: Not on file   Stress: Not on file   Social Connections: Not on file   Intimate Partner Violence: Not on file   Housing Stability: High Risk (2/16/2024)    Housing Stability Vital Sign     Unable to Pay for Housing in the Last Year: No     Number of Times Moved in the Last Year: 2     Homeless in the Last Year: No         I have reviewed the past medical, surgical, social and family history, medications and allergies as documented in the EMR.    Review of systems: ROS is negative other than that noted in the HPI.  Constitutional: Negative for fatigue and fever.     Objective   Ht 5' 7.01\" (1.702 m)   Wt 58.1 kg (128 lb)   BMI 20.04 kg/m²      Physical Exam:    Height 5' 7.01\" (1.702 m), weight 58.1 kg (128 lb), currently breastfeeding.    General/Constitutional: NAD, well developed, well nourished  HENT: Normocephalic, atraumatic  CV: Intact distal pulses, regular rate  Resp: No respiratory distress or labored breathing  GI: Soft and non-tender   Lymphatic: No lymphadenopathy palpated  Neuro: Alert and Oriented x 3, no focal deficits  Psych: Normal mood, normal affect, normal judgement, normal behavior  Skin: Warm, dry, no rashes, no erythema      Knee Exam (focused):               RIGHT LEFT   ROM:   0-130 0-130   Palpation: Effusion negative Baker's cyst      MJL tenderness Negative Negative     LJL tenderness Negative Negative   Meniscus: Mele Negative Negative    Apley's Compression Negative Negative   Instability: Varus stable stable     Valgus stable stable   Special Tests: Lachman Negative Negative     Posterior drawer Negative Negative     Anterior drawer Negative " Negative     Pivot shift not tested not tested     Dial not tested not tested   Patella: Palpation no tenderness no tenderness     Mobility 1/4 1/4     Apprehension Negative Negative   Other: Single leg 1/4 squat not tested not tested           LE NV Exam: +2 DP/PT pulses bilaterally  Sensation intact to light touch L2-S1 bilaterally    No calf tenderness to palpation bilaterally      Knee Imaging    No imaging was performed today      Scribe Attestation      I,:   am acting as a scribe while in the presence of the attending physician.:       I,:   personally performed the services described in this documentation    as scribed in my presence.:

## 2025-07-25 ENCOUNTER — APPOINTMENT (OUTPATIENT)
Dept: RADIOLOGY | Facility: MEDICAL CENTER | Age: 30
End: 2025-07-25
Attending: STUDENT IN AN ORGANIZED HEALTH CARE EDUCATION/TRAINING PROGRAM
Payer: COMMERCIAL

## 2025-07-25 ENCOUNTER — OFFICE VISIT (OUTPATIENT)
Dept: OBGYN CLINIC | Facility: MEDICAL CENTER | Age: 30
End: 2025-07-25
Payer: COMMERCIAL

## 2025-07-25 VITALS — WEIGHT: 142 LBS | BODY MASS INDEX: 22.29 KG/M2 | HEIGHT: 67 IN

## 2025-07-25 DIAGNOSIS — Z01.89 ENCOUNTER FOR LOWER EXTREMITY COMPARISON IMAGING STUDY: ICD-10-CM

## 2025-07-25 DIAGNOSIS — M71.22 BAKER'S CYST OF KNEE, LEFT: Primary | ICD-10-CM

## 2025-07-25 DIAGNOSIS — M25.562 LEFT KNEE PAIN, UNSPECIFIED CHRONICITY: ICD-10-CM

## 2025-07-25 PROCEDURE — 73564 X-RAY EXAM KNEE 4 OR MORE: CPT

## 2025-07-25 PROCEDURE — 99215 OFFICE O/P EST HI 40 MIN: CPT | Performed by: STUDENT IN AN ORGANIZED HEALTH CARE EDUCATION/TRAINING PROGRAM

## 2025-07-25 PROCEDURE — 73562 X-RAY EXAM OF KNEE 3: CPT

## 2025-07-25 RX ORDER — SODIUM CHLORIDE, SODIUM LACTATE, POTASSIUM CHLORIDE, CALCIUM CHLORIDE 600; 310; 30; 20 MG/100ML; MG/100ML; MG/100ML; MG/100ML
20 INJECTION, SOLUTION INTRAVENOUS CONTINUOUS
OUTPATIENT
Start: 2025-07-25

## 2025-07-25 RX ORDER — CHLORHEXIDINE GLUCONATE 40 MG/ML
SOLUTION TOPICAL DAILY PRN
OUTPATIENT
Start: 2025-07-25

## 2025-07-25 RX ORDER — ACETAMINOPHEN 325 MG/1
975 TABLET ORAL ONCE
OUTPATIENT
Start: 2025-07-25 | End: 2025-07-25

## 2025-07-25 RX ORDER — CEFAZOLIN SODIUM 2 G/50ML
2000 SOLUTION INTRAVENOUS ONCE
OUTPATIENT
Start: 2025-07-25 | End: 2025-07-25

## 2025-07-25 NOTE — PROGRESS NOTES
Orthopedic Surgery Office Note  Mariela Mtz (29 y.o. female)   : 1995   MRN: [id number]   Encounter Date: 2025 with Dr. Pj Dsouza,   Encounter department: [facility] [place]  Chief Complaint   Patient presents with    Left Knee - Follow-up     Pt says the cyst has gotten larger and causes more pain. Pt wants to discuss surgery.       Assessment, Plan, & Discussion:     :  Assessment & Plan  Baker's cyst of knee, left  Reviewed physical exam and imaging with patient at time of visit. Radiographic findings demonstrate early onset of osteoarthritis of right knee. Her symptoms are consistent with Baker's cyst of her Right knee.   Discussed with the patient that the Baker's cyst is the effect of her patellofemoral defects/activity level in which surgery will not solve the cause of the baker's cyst  There is a risk that the cyst may reoccur since the cause of the cyst has not changed  Discussed that recovery may include being non weightbearing to the left lower extremity for 2 weeks after surgery to allow the sound to heal, however the decision will be made at the time of surgery  Surgical and recovery process discussed in depth with patient at time of visit  Discussed the patient will need to be prone on her belly for the surgery.  That we will have to incise and dissect and be careful of her popliteal vessels.  Furthermore that the incision in this area heals precariously and will need to be careful possibly.  This could create long-term healing potential issues.  Patient will follow-up in the office post-operatively  The patient expresses understanding and is in agreement with today's treatment plan.         Orders:    XR knee 4+ vw left injury; Future          Return for Post-operatively.      Surgery:   No surgery planned at this time      Orders:     Diagnoses and all orders for this visit:    Baker's cyst of knee, left  -     XR knee 4+ vw left injury;  "Future    Encounter for lower extremity comparison imaging study  -     XR knee 3 vw right non injury; Future         History of Present Illness:     Interval HPI from 7/25/25:  Patient states that she is doing well overall, however she states that she does still have pain at the posterior aspect of the knee.  Patient states that this does interfere with her level of physical activity especially as she is training for half marathon at this point.  Patient had recently met with Dr. Moss who did offer decompression of the cyst however recommended she return to Dr. Dsouza for consideration of excision of Baker's cyst.    Initial HPI:  Mariela Mtz is a 29 y.o. female who presents for consultation at the request of No ref. provider found  regarding Left sided baker cyst which has been present for the past few months and growing significantly. Due to growth patient sought treatment.     Patient has aspiration of cyst 1 month prior, without good effect. Has re-accumulated about 50% since procedure. No pain or effect on daily activities. Would like education/ opinion on necessity of surgical intervention.     Review of Systems  Constitutional: Negative for fatigue, fever or loss of appetite.   HENT: Negative.    Respiratory: Negative for shortness of breath, dyspnea.    Cardiovascular: Negative for chest pain/tightness.   Gastrointestinal: Negative for abdominal pain, N/V.   Endocrine: Negative for cold/heat intolerance, unexplained weight loss/gain.   Genitourinary: Negative for flank pain, dysuria  Skin: Negative for rash.    Psychiatric/Behavioral: Negative for agitation.  All else negative unless otherwise noted in HPI    Physical Exam:   General:  Ht 5' 7\" (1.702 m)   Wt 64.4 kg (142 lb)   BMI 22.24 kg/m²   Cons: Appears well.  No apparent distress.  Psych: Alert. Oriented x3.  Mood and affect normal.  Eyes: PERRLA, EOMI  Resp: Normal effort.  No audible wheezing or stridor.  CV: Extremities warm and well " perfused.   Abd:    No distention or guarding.   Skin: Warm. No visible lesions.  Neuro: Normal muscle tone.      Orthopedic Exam:   Left Knee Exam  Alignment:  Normal knee alignment.  Inspection:  posterior L knee  swelling.  Palpation:  No tenderness.  ROM:  Normal knee ROM.  Strength:  5/5 quadriceps and hamstrings.  Stability:  No objective knee instability. Stable Varus / Valgus stress, Lachman, and Posterior drawer.  Tests:  (-) Mele.  Patella:  Normal patellar mobility.  Neurovascular:  sensation intact in Lower Ex   2+ DP & PT pulses.  Gait:  Normal.       Imaging/Studies:     Study: XR bilateral knees  Date: 7/25/25  Report: I have read and agree with the radiologist report. and I have personally reviewed the imaging via [employer] PACS (Picture Archiving and Communication System) and my impression is as follows:  Mild osteoarthritis with medial joint space narrowing and small osteophyte changes.  No evidence of fracture dislocation or lesion      Study: MRI knee left wo contrast   Date: 12/30/2024  Report: I have read and agree with the radiologist report.  MRI LEFT KNEE     INDICATION:   M71.22: Synovial cyst of popliteal space (Baker), left knee  M76.52: Patellar tendinitis, left knee.     COMPARISON: 12/10/2024     TECHNIQUE:    Multiplanar/multisequence MR of the left knee was performed.        FINDINGS:     SUBCUTANEOUS TISSUES: Normal     JOINT EFFUSION: None.     BAKER'S CYST: Large popliteal cyst with synovitis     MENISCI: Intact.     CRUCIATE LIGAMENTS: Intact.     EXTENSOR APPARATUS: There is focal edema in the superolateral corner of Hoffa's fat pad, which may indicate patellofemoral tracking or impingement abnormality (Patellar Tendon-Lateral Femoral Condyle Friction Syndrome (PT-LFCFS). Lateral patellar tilt     COLLATERAL LIGAMENTS: Intact.     ARTICULAR SURFACES: Grade 4 chondrosis inferior half of the lateral patellar facet.     BONES: No acute fracture or marrow infiltrative process.      MUSCULATURE:  Intact.     IMPRESSION:     Large popliteal cyst with synovitis.     There is focal edema in the superolateral corner of Hoffa's fat pad, which may indicate patellofemoral tracking or impingement abnormality (Patellar Tendon-Lateral Femoral Condyle Friction Syndrome (PT-LFCFS). Lateral patellar tilt     Grade 4 chondrosis inferior half lateral patellar facet.       Procedures  No procedures today.      Medical, Surgical, Family, and Social History    The patient's medical history, family history, and social history, were reviewed and updated as appropriate.    Past Medical History:   Diagnosis Date    Asthma     exercise induced    Preeclampsia     Varicella     childhood     Past Surgical History:   Procedure Laterality Date    IR BIOPSY KIDNEY RANDOM  8/13/2024    WISDOM TOOTH EXTRACTION       Family History   Problem Relation Name Age of Onset    Miscarriages / Stillbirths Mother Jeannie Walter         1    No Known Problems Father      No Known Problems Sister      Diabetes type II Maternal Grandmother Fatmata Mcginnis     Diabetes Maternal Grandmother Fatmata Mcginnis         Type 2    Skin cancer Maternal Grandfather      Prostate cancer Maternal Grandfather      No Known Problems Paternal Grandmother      Emphysema Paternal Grandfather      COPD Paternal Grandfather       Social History     Occupational History    Not on file   Tobacco Use    Smoking status: Never     Passive exposure: Never    Smokeless tobacco: Never   Vaping Use    Vaping status: Never Used   Substance and Sexual Activity    Alcohol use: Never    Drug use: Never    Sexual activity: Yes     Partners: Male     Birth control/protection: None     Allergies   Allergen Reactions    Cat Dander Cough, Hives, Itching, Shortness Of Breath and Wheezing    Latex Hives and Itching       Current Outpatient Medications:     lisinopril (ZESTRIL) 5 mg tablet, Take 0.5 tablets (2.5 mg total) by mouth daily Dont take if pregnant, Disp: 90 tablet,  "Rfl: 2    Prenatal MV-Min-Fe Fum-FA-DHA (PRENATAL 1 PO), Take by mouth, Disp: , Rfl:     sertraline (ZOLOFT) 25 mg tablet, TAKE 1 TABLET BY MOUTH EVERY DAY, Disp: 30 tablet, Rfl: 5    Estimated body mass index is 22.24 kg/m² as calculated from the following:    Height as of this encounter: 5' 7\" (1.702 m).    Weight as of this encounter: 64.4 kg (142 lb).    Laboratory Findings:     Lab Results   Component Value Date/Time    HGB 13.6 08/13/2024 09:24 AM    ALB 4.5 07/20/2024 09:52 AM         This Visit:       Scribe Attestation      I,:  Orly Birch am acting as a scribe while in the presence of the attending physician.:       I,:  Pj Dsouza DO personally performed the services described in this documentation    as scribed in my presence.:             Dr. Pj Dsouza DO, Orthopedic Surgeon  Orthopedic Oncology & Sar[facility] Surgery    "

## 2025-07-29 ENCOUNTER — OFFICE VISIT (OUTPATIENT)
Dept: FAMILY MEDICINE CLINIC | Facility: CLINIC | Age: 30
End: 2025-07-29
Payer: COMMERCIAL

## 2025-07-29 VITALS
HEART RATE: 54 BPM | RESPIRATION RATE: 16 BRPM | BODY MASS INDEX: 22.76 KG/M2 | OXYGEN SATURATION: 99 % | SYSTOLIC BLOOD PRESSURE: 100 MMHG | DIASTOLIC BLOOD PRESSURE: 62 MMHG | WEIGHT: 145 LBS | HEIGHT: 67 IN

## 2025-07-29 DIAGNOSIS — R07.2 PRECORDIAL PAIN: ICD-10-CM

## 2025-07-29 DIAGNOSIS — Z13.1 SCREENING FOR DIABETES MELLITUS: ICD-10-CM

## 2025-07-29 DIAGNOSIS — Z13.220 SCREENING FOR LIPID DISORDERS: ICD-10-CM

## 2025-07-29 DIAGNOSIS — Z00.00 ANNUAL PHYSICAL EXAM: Primary | ICD-10-CM

## 2025-07-29 PROCEDURE — 99395 PREV VISIT EST AGE 18-39: CPT

## 2025-07-29 PROCEDURE — 99203 OFFICE O/P NEW LOW 30 MIN: CPT

## 2025-07-31 ENCOUNTER — OFFICE VISIT (OUTPATIENT)
Dept: DERMATOLOGY | Facility: CLINIC | Age: 30
End: 2025-07-31

## 2025-07-31 VITALS — TEMPERATURE: 97.1 F

## 2025-07-31 DIAGNOSIS — D18.01 CHERRY ANGIOMA: ICD-10-CM

## 2025-07-31 DIAGNOSIS — D22.9 NEVUS: Primary | ICD-10-CM
